# Patient Record
Sex: MALE | Race: WHITE | Employment: FULL TIME | URBAN - METROPOLITAN AREA
[De-identification: names, ages, dates, MRNs, and addresses within clinical notes are randomized per-mention and may not be internally consistent; named-entity substitution may affect disease eponyms.]

---

## 2017-01-04 ENCOUNTER — OP HISTORICAL/CONVERTED ENCOUNTER (OUTPATIENT)
Dept: OTHER | Age: 57
End: 2017-01-04

## 2017-01-27 ENCOUNTER — OP HISTORICAL/CONVERTED ENCOUNTER (OUTPATIENT)
Dept: OTHER | Age: 57
End: 2017-01-27

## 2017-06-16 ENCOUNTER — OP HISTORICAL/CONVERTED ENCOUNTER (OUTPATIENT)
Dept: OTHER | Age: 57
End: 2017-06-16

## 2017-10-23 ENCOUNTER — OP HISTORICAL/CONVERTED ENCOUNTER (OUTPATIENT)
Dept: OTHER | Age: 57
End: 2017-10-23

## 2020-05-04 ENCOUNTER — OFFICE VISIT (OUTPATIENT)
Dept: PRIMARY CARE CLINIC | Age: 60
End: 2020-05-04

## 2020-05-04 DIAGNOSIS — Z20.822 EXPOSURE TO COVID-19 VIRUS: Primary | ICD-10-CM

## 2020-05-04 NOTE — PROGRESS NOTES
Pt seen at [facility] flu clinic. See scanned note . Co worker tested positive over the weekend. Pt asx but would like to be tested.   Verbal consent obtained to treat and bill for services.

## 2020-05-05 LAB — SARS-COV-2, NAA: NOT DETECTED

## 2020-05-18 ENCOUNTER — IP HISTORICAL/CONVERTED ENCOUNTER (OUTPATIENT)
Dept: OTHER | Age: 60
End: 2020-05-18

## 2022-07-28 ENCOUNTER — TRANSCRIBE ORDER (OUTPATIENT)
Dept: SCHEDULING | Age: 62
End: 2022-07-28

## 2022-07-28 DIAGNOSIS — C32.0 MALIGNANT NEOPLASM OF GLOTTIS (HCC): Primary | ICD-10-CM

## 2022-08-04 ENCOUNTER — HOSPITAL ENCOUNTER (OUTPATIENT)
Dept: RADIATION THERAPY | Age: 62
Discharge: HOME | End: 2022-08-04

## 2022-08-04 ENCOUNTER — NURSE NAVIGATOR (OUTPATIENT)
Dept: CASE MANAGEMENT | Age: 62
End: 2022-08-04

## 2022-08-04 NOTE — PROGRESS NOTES
[facility]  Oncology Navigation Assessment    Name: Robert Yang  Age: 57 y.o.  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    Referral Source: Radiation Therapy    Reason for Assessment:   [x]Initial  []Change in status  []Other:     Personal/Family History of Cancer:  [x]No []Yes; type:      Relationship Status:  []Single [x] []Significant Other/Life Partner [] [] []    Support System:    Identified Support system: [x]Spouse/Significant Other []Family  []Friends []Other:   [] Strong  []Fair  []Minimal to None    Living Circumstances:  []Alone  [x]w/ Spouse or Significant Other [] Children/Family  []Caregivers  []Assisted Living Facility/Group Home  []Nursing Facility []Homeless []Environmental/Care Concerns:     Employment Status:  [x]Full-time []Part-time  []Retired []Short-Term Disability []Long-Term Disability []Unemployed []Homemaker     Learning Barriers:    []Mental Status/Mental Health Issue  []Hearing Impairment  []Unable to Read/Write     []Language    []Other:               [x]No Barriers Identified        Barriers to Care and Financial/Legal Concerns:    []Transportation []Citizenship status []Income/Resources  []Food/Clothing Insecurity []Caregiver Makinen  []Noncitizen []Insurance issues []Other:       [x]No Concerns Identified    Advance Care Planning:  []Has AMD   []Does not have AMD    [x]Did Not Address During This Encounter     Coping with Illness:   [x]Coping Well   []Situational Depression []Situational Anxiety []Anticipatory Grief  [] Caregiver Makinen    []Other:     Patient Education Provided:    [] Support Group Information  [] Financial Resources [x] Disease Education [x] Other:  Handout \"Nutrition during cancer treatment\". Narrative:   Pt presented to ENT with hoarseness x months and has been worsening; also c/o moderate pain.  Pt had laryngoscopy with biopsy on 7/18/22. Path shows SCC. Pt also scheduled for lung bx at [facility]. Plan:    Radiation therapy     Referrals:  None at this time. Pt denied any needs but did express concern about being able to work during tx.        Interdisciplinary Team:  Med-Onc:  Surg-Onc:  Rad-Onc:  Dr. Gerber Marrow    Nurse Navigator: Ade Renee RN BSN  [employer]        [phone]

## 2022-08-16 ENCOUNTER — HOSPITAL ENCOUNTER (OUTPATIENT)
Dept: RADIATION THERAPY | Age: 62
Discharge: HOME | End: 2022-08-16
Payer: COMMERCIAL

## 2022-08-16 PROCEDURE — 77334 RADIATION TREATMENT AID(S): CPT

## 2022-08-16 PROCEDURE — 77290 THER RAD SIMULAJ FIELD CPLX: CPT

## 2022-08-19 ENCOUNTER — HOSPITAL ENCOUNTER (OUTPATIENT)
Dept: RADIATION THERAPY | Age: 62
Discharge: HOME | End: 2022-08-19
Payer: COMMERCIAL

## 2022-08-19 PROCEDURE — 77300 RADIATION THERAPY DOSE PLAN: CPT

## 2022-08-19 PROCEDURE — 77295 3-D RADIOTHERAPY PLAN: CPT

## 2022-08-19 PROCEDURE — 77334 RADIATION TREATMENT AID(S): CPT

## 2022-08-22 ENCOUNTER — HOSPITAL ENCOUNTER (OUTPATIENT)
Dept: RADIATION THERAPY | Age: 62
Discharge: HOME | End: 2022-08-22
Payer: COMMERCIAL

## 2022-08-22 PROCEDURE — 77417 THER RADIOLOGY PORT IMAGE(S): CPT

## 2022-08-22 PROCEDURE — 77412 RADIATION TX DELIVERY LVL 3: CPT

## 2022-08-23 ENCOUNTER — HOSPITAL ENCOUNTER (OUTPATIENT)
Dept: RADIATION THERAPY | Age: 62
Discharge: HOME | End: 2022-08-23
Payer: COMMERCIAL

## 2022-08-23 PROCEDURE — 77412 RADIATION TX DELIVERY LVL 3: CPT

## 2022-08-24 ENCOUNTER — HOSPITAL ENCOUNTER (OUTPATIENT)
Dept: RADIATION THERAPY | Age: 62
Discharge: HOME | End: 2022-08-24
Payer: COMMERCIAL

## 2022-08-24 PROCEDURE — 77412 RADIATION TX DELIVERY LVL 3: CPT

## 2022-08-25 ENCOUNTER — HOSPITAL ENCOUNTER (OUTPATIENT)
Dept: RADIATION THERAPY | Age: 62
Discharge: HOME | End: 2022-08-25
Payer: COMMERCIAL

## 2022-08-25 PROCEDURE — 77412 RADIATION TX DELIVERY LVL 3: CPT

## 2022-08-26 ENCOUNTER — HOSPITAL ENCOUNTER (OUTPATIENT)
Dept: RADIATION THERAPY | Age: 62
Discharge: HOME | End: 2022-08-26
Payer: COMMERCIAL

## 2022-08-26 PROCEDURE — 77336 RADIATION PHYSICS CONSULT: CPT

## 2022-08-26 PROCEDURE — 77412 RADIATION TX DELIVERY LVL 3: CPT

## 2022-08-29 ENCOUNTER — HOSPITAL ENCOUNTER (OUTPATIENT)
Dept: RADIATION THERAPY | Age: 62
Discharge: HOME | End: 2022-08-29
Payer: COMMERCIAL

## 2022-08-29 PROCEDURE — 77412 RADIATION TX DELIVERY LVL 3: CPT

## 2022-08-29 PROCEDURE — 77417 THER RADIOLOGY PORT IMAGE(S): CPT

## 2022-08-30 ENCOUNTER — HOSPITAL ENCOUNTER (OUTPATIENT)
Dept: RADIATION THERAPY | Age: 62
Discharge: HOME | End: 2022-08-30
Payer: COMMERCIAL

## 2022-08-30 ENCOUNTER — HOSPITAL ENCOUNTER (OUTPATIENT)
Dept: PHYSICAL THERAPY | Age: 62
Discharge: HOME | End: 2022-08-30
Payer: COMMERCIAL

## 2022-08-30 PROCEDURE — 92610 EVALUATE SWALLOWING FUNCTION: CPT

## 2022-08-30 PROCEDURE — 77412 RADIATION TX DELIVERY LVL 3: CPT

## 2022-08-30 NOTE — THERAPY EVALUATION
[address], [address]  Ph: [phone]    Fax: [phone]    Plan of Care/ Statement of Necessity for Speech Therapy Services    Patient name: Deonna Katz Start of Care: 2022   Referral source: Bailee Rodriguez MD : 1960    Medical Diagnosis: Dysphagia, unspecified [R13.10]  Malignant neoplasm of glottis [C32.0]   Onset Date:22    Treatment Diagnosis: dysphagia   Prior Hospitalization: see medical history Provider#: [id number]   Medications: Verified on Patient summary List    Comorbidities: multiple ortho surgeries, hx of tobacco use   Prior Level of Function: independent    The Plan of Care and following information is based on the information from the initial evaluation. Assessment/ key information: Patient presents w/ minimal dysphagia s/t dx squamous cell carcinoma of the glottis and odynophagia. Patient describes his symptoms as globus sensation and feeling like he is going to get choked. He does report increase need to clear his throat after swallowing. Radiation effects of dry mouth, decreased taste and increased pain can continue to impact patient's swallow function and over-all nutritional intake placing him at risk of aspiration, dehydration and nutritional decline. Recent tooth removal ( molars ) has negatively impacted patient's ability to masticate, which he is trying to adapt to and would benefit from modified diet textures. Oral motor largely WFL at this time w/ some irritation noted around soft palate. Swallow initiation timely, however effortful. Intermittent clinical indicators of penetration/aspiration noted after the swallows of thins and solids c/b through clearing. Patient would benefit from ST services to establish prophylactic swallowing exercises, maintain swallow function and continued assessment of oral and pharyngeal function for diet modifications as needed.      Problem List:   Dysphagia    Treatment Plan may include any combination of the following: Dysphagia Treatment, Treatment of Swallowing, and Patient Education      Patient / Family readiness to learn indicated by: asking questions, trying to perform skills, and interest    Persons(s) to be included in education:   patient (P)    Barriers to Learning/Limitations: yes;  none    Patient Goal (s): To continue to carry on    Patient Self Reported Health Status: good    Rehabilitation Potential: good    AMPA score: 32.49%    MD. Glory Morel Dysphagia Inventory: 57.89% -Moderate functioning    Short Term Goals: To be accomplished in 4-6 treatments  -Establish prophylactic swallowing exercises to preserve and maintain swallow function   -Modify diet as tolerated to reduce risk of aspiration and nutritional decline   -MBS as indicated   -On-going assessment of oral and pharyngeal changes s/p radiation tx   -Tolerate LRD w/o overt s/sx of aspiration/penetration and adequate nutritional intake     Long Term Goals:  To be accomplished in 8-12 treatments   -Maintain p.o. intake of least restrictive diet w/o clinical indicators of penetration/aspiration and adequate nutritional intake  -Preserve and maintain swallow function w/ use of oral and pharyngeal swallowing exercises and compensatory swallow strategies.   -Increase percentage of MD. Glory Morel Dysphagia Inventory by 5%    Frequency / Duration: Patient to be seen 1 times per week for 12 weeks:      Certification Period: 22-22      SWALLOW EVALUATION    Patient Name: Yash Hammer  Date:2022   : 1960  [x]  Patient  Verified  Payor: Mellissa Regan / Plan: [place] [employer] / Product Type: HMO /   In time: 0145pm Out time: 0240pm      SUBJECTIVE  Pain Level (0-10 scale): 0  Any medication changes, allergies to medications, adverse drug reactions, diagnosis change, or new procedure performed?: [] No    [x] Yes (see summary sheet for update)  Patient seen for swallow evaluation. He is referred by his radiation oncologist s/p dx of squamous cell carcinoma of the glottis. CASE HISTORY:   57 yom w/ dx of squamous cell carcinoma of the glottis 7/12/22. Patient was referred to radiotherapy for his glottic cancer and is planned to have 30 XRT. Patient started XRT 8/22 and has completed 7 total to date. Presently, patient reports inconsistent swallowing difficulty and reports it as, \" feeling like I am going to get choked. \" He reported onset of swallowing difficulty s/p his biopsy. He has seen a dentist prior to radiation tx, in which he had all his molars pulled. He feels this has impacted his swallowing and ability to chew. Patient has started to experience some pain when swallowing. Vocal quality is hoarse given the presence of tumor. He is currently consuming a regular diet and thin liquids. His goals are to maintain swallow function and avoid a feeding tube. Patient is  and is currently working as a superintendent.            OBJECTIVE FINDINGS:       Dentition:  []intact [x]sparse []edentulous []dentures  []partials  Respiratory Status: [x]WFL []SOB  []O2 L/min:  []NC []Mask         []Trach Tube:                     []Excess secretions      [] cuffed []cuffless  Size and type  Lips:  [x]Symmetrical  []asymmetrical  Retraction [x]WFL  []?min []?mod []?max  Protrusion [x]WFL  []?min []?mod []?max  Strength [x]WFL  []?min []?mod []?max  Puff  [x]WFL  []?min []?mod []?max  Tongue:  [x]Symmetrical             []asymmetrical  Protrusion [x]WFL  []?min []?mod []?max  Elevation [x]WFL  []?min []?mod []?max  Depression [x]WFL  []?min []?mod []?max  Lateralization [x]WFL  []?min []?mod []?max  Strength [x]WFL  []?min []?mod []?max  Velum:  [x]Symmetrical  []asymmetrical  Gag Reflex:  []Present []absent [x]DNT  Sensation:  [x]Intact []Diminished  Specify:   Voice:  []Normal [x]Hoarse []harsh  []Breathy []Hypernasal []hyponasal  []Gurgly Other: Swallow:  [x]Volitional []absent  [x]Reflexive []absent  Cough   Strength : [x]WNL  []Diminished  []Volitional []absent  []Reflexive []absent      OP SWALLOW EVALUATION  Observation of Swallow: Thin Nectar Honey Puree  applesauce Solids  Mechanical soft fruit and grain bar/regular peanut butter cracker nabs Other  Mixed consistency canned fruit cocktail   Oral Phase WFL        Increased mastication     X    Other         Oral Pharyngeal Phase WFL        Delayed swallow initiation     X    Pharyngeal Phase         Throat clear post swallow X    X    Other     C/o of effortful swallow      [] No symptoms of dysphagia evidenced  [x] Symptoms of dysphagia observed  [] Patient at risk for aspiration  [] Other:     RECOMMENDATIONS/PLAN:   Soft diet, thin liquids. Compensatory swallow strategies of : slow rate, small bites and sips, effortful swallows. REFLUX precautions  ST 1x a week for dysphagia tx  Prophylactic swallowing exercises   MBS as indicated  Biotene for dry mouth  Continue to use Triple Rinse solution for oral pain  Oral hygiene protocol.         Diet:  [] NPO    [] Pureed [] Ground [x] MechSoft [] Regular  Liquids: [] Water  [x] Regular [] Thickened   [] [place]  [] Honeythick  [] Pudding    [] Free Water  Presentation: [] Cup    [] Spoon [] Straw [] Alternate liquids and solids  Monitor: [] Sitting up at 90 deg [] Reclined to:  [] Head turned to:    [] Chin tuck  [] Head tilt to:      [] Seated upright post meals (min):  Document: [x] Coughing [] Temperatures [] Lung Sounds    Videoflouroscopy:  [x] Yes [] No  Dysphagia Treatment: [x] Yes [] No Sessions per week: 1x a week  Other:    Remediation Techniques:  C = Compensatory techniques to use during meal      F = Facilitation/treatment techniques by SLP    [] Supraglottic Swallow (c,f)    [] Oral motor exercises (f)  [] Super-supraglottic swallow (c,f)   [] Labial closure  [] Compensations for pocketing (c)   [] Lingual elevation  [] Sweep mouth with tongue    [] Lingual lateralization  [] Sweep mouth with finger    [] Lingual anterior-posterior  [] External pressure to check              [] Lingual base of tongue  [] Rinse mouth/expel after meal   [] Vocal Fold Exercises (f)  [] Alternate liquid swallows every _ bites (c)  [] Falsetto/laryngeal elevation exercises (f)  [] Discourage liquid wash between bites (c)  [] Thermal application (c,f)  [x] Multiple swallows     [] Sour bolus (f)   [x] small bites and sips    [] Cold bolus (f)  [x] slow rate       [x] Pharyngeal exercise (f)  [x] GERD precautions      [x] Breath hold    [] Patient needs cues     [x] Effortful Swallow (c,f)    [] Patient does not need cues   [x] Tongue base retraction   [] Mendelsohn Maneuver (c,f)    [x] Tongue hold    [] Encourage/stimulate lip closure (c)  [] Laryngeal closure    [] Empty mouth before next bite (c)   [] NMES   [] Cue patient to slow down (c)   [x] Manual therapy/myofascial release     [] Encourage coughing (c)      []Chin tuck (c)  []Head turn  (c)  []Head turn , chin tuck (c)    Patient/Caregiver instruction/education: Patient educated to findings, diet recommendations, compensatory swallow strategies and POC  HEP/Handouts given: Dysphagia w/ Head and Neck CA packet and compensatory swallow strategies handout    Pain Level (0-10 scale) post treatment: 0      Natacha Lowery M.S. [id number] [place] [address] 8/30/2022 11:33 AM       ________________________________________________________________________    I certify that the above Therapy Services are being furnished while the patient is under my care. I agree with the treatment plan and certify that this therapy is necessary.     [de-identified] Signature:____________________  Date:___________Time:_________           Benson Varela MD        Please sign and return to [address] [address] [facility] [address]  Ph: [phone]    Fax: [phone]     Thank you

## 2022-08-31 ENCOUNTER — HOSPITAL ENCOUNTER (OUTPATIENT)
Dept: RADIATION THERAPY | Age: 62
Discharge: HOME | End: 2022-08-31
Payer: COMMERCIAL

## 2022-08-31 PROCEDURE — 77412 RADIATION TX DELIVERY LVL 3: CPT

## 2022-09-01 ENCOUNTER — HOSPITAL ENCOUNTER (OUTPATIENT)
Dept: RADIATION THERAPY | Age: 62
Discharge: HOME | End: 2022-09-01
Payer: COMMERCIAL

## 2022-09-01 PROCEDURE — 77412 RADIATION TX DELIVERY LVL 3: CPT

## 2022-09-02 ENCOUNTER — HOSPITAL ENCOUNTER (OUTPATIENT)
Dept: RADIATION THERAPY | Age: 62
Discharge: HOME | End: 2022-09-02
Payer: COMMERCIAL

## 2022-09-02 PROCEDURE — 77336 RADIATION PHYSICS CONSULT: CPT

## 2022-09-02 PROCEDURE — 77412 RADIATION TX DELIVERY LVL 3: CPT

## 2022-09-06 ENCOUNTER — HOSPITAL ENCOUNTER (OUTPATIENT)
Dept: RADIATION THERAPY | Age: 62
Discharge: HOME | End: 2022-09-06
Payer: COMMERCIAL

## 2022-09-06 PROCEDURE — 77412 RADIATION TX DELIVERY LVL 3: CPT

## 2022-09-06 PROCEDURE — 77417 THER RADIOLOGY PORT IMAGE(S): CPT

## 2022-09-07 ENCOUNTER — HOSPITAL ENCOUNTER (OUTPATIENT)
Dept: RADIATION THERAPY | Age: 62
Discharge: HOME | End: 2022-09-07
Payer: COMMERCIAL

## 2022-09-07 PROCEDURE — 77412 RADIATION TX DELIVERY LVL 3: CPT

## 2022-09-08 ENCOUNTER — HOSPITAL ENCOUNTER (OUTPATIENT)
Dept: PHYSICAL THERAPY | Age: 62
Discharge: HOME | End: 2022-09-08
Payer: COMMERCIAL

## 2022-09-08 ENCOUNTER — HOSPITAL ENCOUNTER (OUTPATIENT)
Dept: RADIATION THERAPY | Age: 62
Discharge: HOME | End: 2022-09-08
Payer: COMMERCIAL

## 2022-09-08 PROCEDURE — 92526 ORAL FUNCTION THERAPY: CPT

## 2022-09-08 PROCEDURE — 77412 RADIATION TX DELIVERY LVL 3: CPT

## 2022-09-08 NOTE — PROGRESS NOTES
ST DAILY TREATMENT NOTE    Patient Name: Amita Duarte  Date:2022  : 1960  [x]  Patient  Verified  Payor: Payor: Mila Pena / Plan: Portia Trinidad / Product Type: HMO /   In time:  Out time: 0315PM  Total Treatment Time (min): 45  Total Timed Codes (min): 45  1:1 Treatment Time ( [address] only): 45      Treatment Diagnosis: Dysphagia, unspecified [R13.10]  Malignant neoplasm of glottis [C32.0]    SUBJECTIVE  Pain Level (0-10 scale): 0  Any medication changes, allergies to medications, adverse drug reactions, diagnosis change, or new procedure performed?: [x] No    [] Yes (see summary sheet for update)    Subjective functional status/changes:       Patient seen for ST. Patient reports changes in p.o. intake due to radiation effects. Please see below      OBJECTIVE  Treatment provided includes:   IIncrease/Improve:  []  Voice Quality []  Cognitive Linguistic Skills [x]   Laryngeal/Pharyngeal Exercises   []  Vocal Loudness []  Reading Comprehension [x]  Swallowing Skills    []  Vocal Cord Function []  Auditory Comprehension []  Oral Motor Skills   []  Resonance []  Writing Skills [x]  Compensatory strategies    []  Speech Intelligibility []  Expressive Language []  Attention   []  Breath Support/Coord. []  Receptive language []  Memory   []  Articulation []  Safety Awareness []    []  Fluency []  Word Retrieval []        Treatment Provided:    Chemo/radiation check list:  XRT completed:   Chemo completed: 0/0  Weight loss to date: 3-4 lbs. Current weight 278lbs  Current diet: Patient reports a combination of puree's and liquids. Soups, milkshakes.      Weight loss:  YES   Dry mouth:  YES- using biotene, which has mad a big difference per patient   Loss of taste:  YES-no taste   Pain at rest:  NO   Pain when swallowing:  YES- 6 +   Changes in swallow function (oral and/or pharyngeal ):  NO-  patient reports an effortful swallow and fear of getting choked but feels this is unchanged from baseline   Tolerating diet:  NO- has regressed to puree's and liquids. External tissue changes:  YES- Red   Internal tissue changes ( oral and orophayngeal cavity ):  YES- Red. Irritation of oropharynx. Administered thin liquids to trial. Patient w/ slight hesitation in swallow initiation s/t anticipated pain. Effortful swallow response w/ facial grimacing. \" Feels like I am swallowing glass. \" Hyolaryngeal excursion and protraction appears adequate to digital palpation. No clinical indicators of penetration or aspiration noted. Administered solid. Patient w/ slow mastication and oral transit. Over-all c/o of dryness. Pharyngeal response adequate, however patient reports globus sensation after the swallow. Liquid wash aids in some reduction in globus sensation, however sensation still present. Delayed cough response present. Patient educated to and completed prophylactic pharyngeal and laryngeal exercises to preserve and maintain swallow function. Exercises completed:   Pennie - x10  Lingual press x 10  Supraglottic swallow x 10   Effortful swallow x10   Shaker x 10  Lingual extension x10     At this time it is recommended patient consume puree's and liquids as tolerated. Use of magic mouthwash and biotene as needed. Advised patient that he may benefit from Glucerna shakes (patient is a diabetic) for additional nutrition. Patient has reported a drop in his intake due to odynophagia. Educated patient to clinical indicators of aspiration.          Patient/Caregiver  Education: [x] Review HEP     Swallowing exercises as tolerated    Pain Level (0-10 scale) post treatment: 0    ASSESSMENT   []   Improving appropriately and progressing toward goals  [x]   Improving slowly and progressing toward goals  []   Approximating goals/maximum potential  []   Continues to benefit from skilled therapy to address remaining functional deficits  []   Not progressing toward goals and plan of care will be adjusted    Patient will continue to benefit from skilled therapy to address remaining functional deficits: dysphagia     Progress towards goals / Updated goals:     PLAN  [x]  Continue plan of care  []  Modify Goals/Treatment Plan      []  Discharge due to:  [] Other:    Short Term Goals: To be accomplished in 4-6 treatments  -Establish prophylactic swallowing exercises to preserve and maintain swallow function   -Modify diet as tolerated to reduce risk of aspiration and nutritional decline   -MBS as indicated   -On-going assessment of oral and pharyngeal changes s/p radiation tx   -Tolerate LRD w/o overt s/sx of aspiration/penetration and adequate nutritional intake      Long Term Goals:  To be accomplished in 8-12 treatments   -Maintain p.o. intake of least restrictive diet w/o clinical indicators of penetration/aspiration and adequate nutritional intake  -Preserve and maintain swallow function w/ use of oral and pharyngeal swallowing exercises and compensatory swallow strategies.   -Increase percentage of [facility] by 5%      Maryam Rodriguez, SLP, M.S. [id number] [facility]   9/8/2022  1:57 PM

## 2022-09-09 ENCOUNTER — HOSPITAL ENCOUNTER (OUTPATIENT)
Dept: RADIATION THERAPY | Age: 62
Discharge: HOME | End: 2022-09-09
Payer: COMMERCIAL

## 2022-09-09 PROCEDURE — 77412 RADIATION TX DELIVERY LVL 3: CPT

## 2022-09-12 ENCOUNTER — HOSPITAL ENCOUNTER (OUTPATIENT)
Dept: RADIATION THERAPY | Age: 62
Discharge: HOME | End: 2022-09-12
Payer: COMMERCIAL

## 2022-09-12 PROCEDURE — 77417 THER RADIOLOGY PORT IMAGE(S): CPT

## 2022-09-12 PROCEDURE — 77412 RADIATION TX DELIVERY LVL 3: CPT

## 2022-09-12 PROCEDURE — 77336 RADIATION PHYSICS CONSULT: CPT

## 2022-09-13 ENCOUNTER — HOSPITAL ENCOUNTER (OUTPATIENT)
Dept: RADIATION THERAPY | Age: 62
Discharge: HOME | End: 2022-09-13
Payer: COMMERCIAL

## 2022-09-13 PROCEDURE — 77412 RADIATION TX DELIVERY LVL 3: CPT

## 2022-09-14 ENCOUNTER — HOSPITAL ENCOUNTER (OUTPATIENT)
Dept: RADIATION THERAPY | Age: 62
Discharge: HOME | End: 2022-09-14
Payer: COMMERCIAL

## 2022-09-14 PROCEDURE — 77412 RADIATION TX DELIVERY LVL 3: CPT

## 2022-09-15 ENCOUNTER — HOSPITAL ENCOUNTER (OUTPATIENT)
Dept: PHYSICAL THERAPY | Age: 62
Discharge: HOME | End: 2022-09-15
Payer: COMMERCIAL

## 2022-09-15 ENCOUNTER — HOSPITAL ENCOUNTER (OUTPATIENT)
Dept: RADIATION THERAPY | Age: 62
Discharge: HOME | End: 2022-09-15
Payer: COMMERCIAL

## 2022-09-15 PROCEDURE — 77412 RADIATION TX DELIVERY LVL 3: CPT

## 2022-09-15 PROCEDURE — 92526 ORAL FUNCTION THERAPY: CPT

## 2022-09-15 NOTE — PROGRESS NOTES
ST DAILY TREATMENT NOTE    Patient Name: Haley Salguero  Date:9/15/2022  : 1960  [x]  Patient  Verified  Payor: Payor: Dayanara Sofia / Plan: Yaa December / Product Type: HMO /   In time: 1130AM Out time:1215PM  Total Treatment Time (min): 45  Total Timed Codes (min): 45  1:1 Treatment Time ( [address] only): 45        Treatment Diagnosis: Dysphagia, unspecified [R13.10]  Malignant neoplasm of glottis [C32.0]    SUBJECTIVE  Pain Level (0-10 scale): 7/10 throat  Any medication changes, allergies to medications, adverse drug reactions, diagnosis change, or new procedure performed?: [x] No    [] Yes (see summary sheet for update)    Subjective functional status/changes:   [] No changes reported  Patient seen for swallow tx. Patient reports increased throat pain at test impacting swallowing function and oral intake. OBJECTIVE  Treatment provided includes:   IIncrease/Improve:  []  Voice Quality []  Cognitive Linguistic Skills [x]  Laryngeal/Pharyngeal Exercises   []  Vocal Loudness []  Reading Comprehension [x]  Swallowing Skills    []  Vocal Cord Function []  Auditory Comprehension []  Oral Motor Skills   []  Resonance []  Writing Skills [x]  Compensatory strategies    []  Speech Intelligibility []  Expressive Language []  Attention   []  Breath Support/Coord. []  Receptive language []  Memory   []  Articulation []  Safety Awareness []    []  Fluency []  Word Retrieval []        Treatment Provided:  Chemo/radiation check list:  XRT completed:   Chemo completed: 0  Current Weight 256. Patient has lost 22lb in a 2 weeks since last tx session 22.   Current diet: puree's, liquids, some mechanical soft textures    Weight loss:  YES -22lbs in two weeks   Dry mouth:  YES   Loss of taste:  YES   Pain at rest:  YES 7/10   Pain when swallowing:  YES 10+   Changes in swallow function (oral and/or pharyngeal ):  YES- increased coughing when swallowing   Tolerating diet:  YES- but oral intake is limited to modified textures and amount   External tissue changes:  YES- Red, sunburn   Internal tissue changes ( oral and orophayngeal cavity ):  YES- oral all oral mucosa looks pink and healthy. There are few ulcers noted near corners of mouth. Oral and pharyngeal assessment completed. Oral ROM remains functional w/ tissue largely pink and healthy w/o much visible irritation. Volitional swallows are effortful w/ pain. Patient reports 10+ pain when swallowing, increased mucous and increased swallowing difficulty, which is negatively impacting his p.o. intake. Administered thin liquids to trial. Patient w/ increased clinical indicators of penetration and aspiration after the swallow c/b cough response and throat clear. This is inconsistent. Pharyngeal swallow w/ atypical swallow sounds ( sounds tight ) and presumptive reduced pharyngeal clearance s/t multiple swallows elicited. Use of chin tuck does reduce these clinical indicators of penetration and aspiration and patient reports improved swallow. S/sx of aspiration and penetration also reduced w/ use of mildly thick liquids. At this time there are concerns for change in swallow function increasing patient's risk for aspiration. Reviewed these clinical indicators w/ patient and compensatory swallow strategies to mitigate these symptoms. Changes in swallow function could be impacted by pain, increased mucous and pharyngeal edema from radiation. Educated to use of thickener and hand out provided. Educated to use of chin tuck w/ thin liquids. Due to patient's decreased oral intake. Helped patient set up meal planning goals and advised to try to consume more frequent meals through out the day vs 3 large meals. MBS to be completed when patient completes his radiation tx. Patient/Caregiver  Education: [x] Review HEP      Continue w/ puree and ground textures as tolerated.    Thin liquids with use of chin tuck (helps protect the airway)  Thicken liquids if signs/symptoms of aspiration increase (coughing with swallowing ) - see separate handout   Consume frequent snacks through out the day vs 3 large meals   Try meal planning   Better to snack then skip a meal  Room temperature liquids     HEP/Handouts given: swallow strategies and recommendations. Pain Level (0-10 scale) post treatment: 0    ASSESSMENT   [x]   Improving appropriately and progressing toward goals  [x]   Improving slowly and progressing toward goals  []   Approximating goals/maximum potential  []   Continues to benefit from skilled therapy to address remaining functional deficits  []   Not progressing toward goals and plan of care will be adjusted    Patient will continue to benefit from skilled therapy to address remaining functional deficits: swallowing    Progress towards goals / Updated goals:     PLAN  [x]  Continue plan of care  []  Modify Goals/Treatment Plan      []  Discharge due to:  [] Other:    Short Term Goals: To be accomplished in 4-6 treatments  -Establish prophylactic swallowing exercises to preserve and maintain swallow function   -Modify diet as tolerated to reduce risk of aspiration and nutritional decline   -MBS as indicated   -On-going assessment of oral and pharyngeal changes s/p radiation tx   -Tolerate LRD w/o overt s/sx of aspiration/penetration and adequate nutritional intake      Long Term Goals:  To be accomplished in 8-12 treatments   -Maintain p.o. intake of least restrictive diet w/o clinical indicators of penetration/aspiration and adequate nutritional intake  -Preserve and maintain swallow function w/ use of oral and pharyngeal swallowing exercises and compensatory swallow strategies.   -Increase percentage of MD. [place] Marie Dysphagia Inventory by 5%    Ally Peacock, LEXI, M.S. [id number] [facility]   9/15/2022  1:57 PM

## 2022-09-16 ENCOUNTER — HOSPITAL ENCOUNTER (OUTPATIENT)
Dept: RADIATION THERAPY | Age: 62
Discharge: HOME | End: 2022-09-16
Payer: COMMERCIAL

## 2022-09-16 PROCEDURE — 77412 RADIATION TX DELIVERY LVL 3: CPT

## 2022-09-19 ENCOUNTER — HOSPITAL ENCOUNTER (OUTPATIENT)
Dept: RADIATION THERAPY | Age: 62
Discharge: HOME | End: 2022-09-19
Payer: COMMERCIAL

## 2022-09-19 PROCEDURE — 77417 THER RADIOLOGY PORT IMAGE(S): CPT

## 2022-09-19 PROCEDURE — 77336 RADIATION PHYSICS CONSULT: CPT

## 2022-09-19 PROCEDURE — 77412 RADIATION TX DELIVERY LVL 3: CPT

## 2022-09-20 ENCOUNTER — HOSPITAL ENCOUNTER (OUTPATIENT)
Dept: RADIATION THERAPY | Age: 62
Discharge: HOME | End: 2022-09-20
Payer: COMMERCIAL

## 2022-09-20 PROCEDURE — 77412 RADIATION TX DELIVERY LVL 3: CPT

## 2022-09-21 ENCOUNTER — HOSPITAL ENCOUNTER (OUTPATIENT)
Dept: RADIATION THERAPY | Age: 62
Discharge: HOME | End: 2022-09-21
Payer: COMMERCIAL

## 2022-09-21 PROCEDURE — 77412 RADIATION TX DELIVERY LVL 3: CPT

## 2022-09-22 ENCOUNTER — HOSPITAL ENCOUNTER (OUTPATIENT)
Dept: RADIATION THERAPY | Age: 62
Discharge: HOME | End: 2022-09-22
Payer: COMMERCIAL

## 2022-09-22 PROCEDURE — 77412 RADIATION TX DELIVERY LVL 3: CPT

## 2022-09-23 ENCOUNTER — HOSPITAL ENCOUNTER (OUTPATIENT)
Dept: RADIATION THERAPY | Age: 62
Discharge: HOME | End: 2022-09-23
Payer: COMMERCIAL

## 2022-09-23 PROCEDURE — 77412 RADIATION TX DELIVERY LVL 3: CPT

## 2022-09-26 ENCOUNTER — HOSPITAL ENCOUNTER (INPATIENT)
Age: 62
LOS: 2 days | Discharge: HOME HEALTH | DRG: 640 | End: 2022-09-28
Attending: STUDENT IN AN ORGANIZED HEALTH CARE EDUCATION/TRAINING PROGRAM | Admitting: INTERNAL MEDICINE
Payer: COMMERCIAL

## 2022-09-26 ENCOUNTER — HOSPITAL ENCOUNTER (OUTPATIENT)
Dept: RADIATION THERAPY | Age: 62
Discharge: HOME | End: 2022-09-26
Payer: COMMERCIAL

## 2022-09-26 DIAGNOSIS — N17.9 AKI (ACUTE KIDNEY INJURY) (HCC): Primary | ICD-10-CM

## 2022-09-26 DIAGNOSIS — R62.7 FTT (FAILURE TO THRIVE) IN ADULT: ICD-10-CM

## 2022-09-26 PROBLEM — E86.0 DEHYDRATION: Status: ACTIVE | Noted: 2022-09-26

## 2022-09-26 LAB
ANION GAP SERPL CALC-SCNC: 7 MMOL/L (ref 5–15)
BASOPHILS # BLD: 0 K/UL (ref 0–0.1)
BASOPHILS NFR BLD: 1 % (ref 0–1)
BUN SERPL-MCNC: 80 MG/DL (ref 6–20)
BUN/CREAT SERPL: 35 (ref 12–20)
CA-I BLD-MCNC: 9.7 MG/DL (ref 8.5–10.1)
CHLORIDE SERPL-SCNC: 99 MMOL/L (ref 97–108)
CO2 SERPL-SCNC: 28 MMOL/L (ref 21–32)
CREAT SERPL-MCNC: 2.26 MG/DL (ref 0.7–1.3)
DIFFERENTIAL METHOD BLD: ABNORMAL
EOSINOPHIL # BLD: 0.1 K/UL (ref 0–0.4)
EOSINOPHIL NFR BLD: 2 % (ref 0–7)
ERYTHROCYTE [DISTWIDTH] IN BLOOD BY AUTOMATED COUNT: 13.3 % (ref 11.5–14.5)
GLUCOSE SERPL-MCNC: 132 MG/DL (ref 65–100)
HCT VFR BLD AUTO: 39.2 % (ref 36.6–50.3)
HGB BLD-MCNC: 13.4 G/DL (ref 12.1–17)
IMM GRANULOCYTES # BLD AUTO: 0 K/UL (ref 0–0.04)
IMM GRANULOCYTES NFR BLD AUTO: 0 % (ref 0–0.5)
LACTATE SERPL-SCNC: 1.4 MMOL/L (ref 0.4–2)
LYMPHOCYTES # BLD: 1.4 K/UL (ref 0.8–3.5)
LYMPHOCYTES NFR BLD: 22 % (ref 12–49)
MCH RBC QN AUTO: 30 PG (ref 26–34)
MCHC RBC AUTO-ENTMCNC: 34.2 G/DL (ref 30–36.5)
MCV RBC AUTO: 87.9 FL (ref 80–99)
MONOCYTES # BLD: 0.9 K/UL (ref 0–1)
MONOCYTES NFR BLD: 14 % (ref 5–13)
NEUTS SEG # BLD: 4.1 K/UL (ref 1.8–8)
NEUTS SEG NFR BLD: 61 % (ref 32–75)
NRBC # BLD: 0 K/UL (ref 0–0.01)
NRBC BLD-RTO: 0 PER 100 WBC
PLATELET # BLD AUTO: 320 K/UL (ref 150–400)
PMV BLD AUTO: 9.3 FL (ref 8.9–12.9)
POTASSIUM SERPL-SCNC: 4 MMOL/L (ref 3.5–5.1)
RBC # BLD AUTO: 4.46 M/UL (ref 4.1–5.7)
SODIUM SERPL-SCNC: 134 MMOL/L (ref 136–145)
WBC # BLD AUTO: 6.6 K/UL (ref 4.1–11.1)

## 2022-09-26 PROCEDURE — 36415 COLL VENOUS BLD VENIPUNCTURE: CPT

## 2022-09-26 PROCEDURE — 99285 EMERGENCY DEPT VISIT HI MDM: CPT

## 2022-09-26 PROCEDURE — 85025 COMPLETE CBC W/AUTO DIFF WBC: CPT

## 2022-09-26 PROCEDURE — 74011250636 HC RX REV CODE- 250/636: Performed by: INTERNAL MEDICINE

## 2022-09-26 PROCEDURE — 74011000250 HC RX REV CODE- 250: Performed by: STUDENT IN AN ORGANIZED HEALTH CARE EDUCATION/TRAINING PROGRAM

## 2022-09-26 PROCEDURE — 74011250636 HC RX REV CODE- 250/636: Performed by: STUDENT IN AN ORGANIZED HEALTH CARE EDUCATION/TRAINING PROGRAM

## 2022-09-26 PROCEDURE — 83605 ASSAY OF LACTIC ACID: CPT

## 2022-09-26 PROCEDURE — 74011250637 HC RX REV CODE- 250/637: Performed by: INTERNAL MEDICINE

## 2022-09-26 PROCEDURE — 77336 RADIATION PHYSICS CONSULT: CPT

## 2022-09-26 PROCEDURE — 80048 BASIC METABOLIC PNL TOTAL CA: CPT

## 2022-09-26 PROCEDURE — 77412 RADIATION TX DELIVERY LVL 3: CPT

## 2022-09-26 PROCEDURE — 65270000029 HC RM PRIVATE

## 2022-09-26 PROCEDURE — 96360 HYDRATION IV INFUSION INIT: CPT

## 2022-09-26 PROCEDURE — 74011000250 HC RX REV CODE- 250: Performed by: HOSPITALIST

## 2022-09-26 PROCEDURE — 77417 THER RADIOLOGY PORT IMAGE(S): CPT

## 2022-09-26 PROCEDURE — 74011000250 HC RX REV CODE- 250: Performed by: INTERNAL MEDICINE

## 2022-09-26 RX ORDER — SERTRALINE HYDROCHLORIDE 50 MG/1
50 TABLET, FILM COATED ORAL
Status: ON HOLD | COMMUNITY
End: 2022-09-28 | Stop reason: SDUPTHER

## 2022-09-26 RX ORDER — SILVER SULFADIAZINE 10 G/1000G
CREAM TOPICAL 3 TIMES DAILY
COMMUNITY

## 2022-09-26 RX ORDER — POLYETHYLENE GLYCOL 3350 17 G/17G
17 POWDER, FOR SOLUTION ORAL DAILY PRN
Status: DISCONTINUED | OUTPATIENT
Start: 2022-09-26 | End: 2022-09-28 | Stop reason: HOSPADM

## 2022-09-26 RX ORDER — ONDANSETRON 4 MG/1
4 TABLET, ORALLY DISINTEGRATING ORAL
Status: DISCONTINUED | OUTPATIENT
Start: 2022-09-26 | End: 2022-09-28 | Stop reason: HOSPADM

## 2022-09-26 RX ORDER — ENOXAPARIN SODIUM 100 MG/ML
30 INJECTION SUBCUTANEOUS 2 TIMES DAILY
Status: DISCONTINUED | OUTPATIENT
Start: 2022-09-26 | End: 2022-09-28 | Stop reason: HOSPADM

## 2022-09-26 RX ORDER — TAMSULOSIN HYDROCHLORIDE 0.4 MG/1
0.4 CAPSULE ORAL DAILY
COMMUNITY
End: 2022-09-28

## 2022-09-26 RX ORDER — LISINOPRIL 20 MG/1
20 TABLET ORAL DAILY
COMMUNITY
End: 2022-09-28

## 2022-09-26 RX ORDER — ACETAMINOPHEN 325 MG/1
650 TABLET ORAL
Status: DISCONTINUED | OUTPATIENT
Start: 2022-09-26 | End: 2022-09-28 | Stop reason: HOSPADM

## 2022-09-26 RX ORDER — SILVER SULFADIAZINE 10 G/1000G
CREAM TOPICAL 3 TIMES DAILY
Status: DISCONTINUED | OUTPATIENT
Start: 2022-09-26 | End: 2022-09-28 | Stop reason: HOSPADM

## 2022-09-26 RX ORDER — LIDOCAINE HYDROCHLORIDE 20 MG/ML
JELLY TOPICAL AS NEEDED
Status: DISCONTINUED | OUTPATIENT
Start: 2022-09-26 | End: 2022-09-28 | Stop reason: HOSPADM

## 2022-09-26 RX ORDER — SODIUM CHLORIDE 0.9 % (FLUSH) 0.9 %
5-40 SYRINGE (ML) INJECTION EVERY 8 HOURS
Status: DISCONTINUED | OUTPATIENT
Start: 2022-09-26 | End: 2022-09-28 | Stop reason: HOSPADM

## 2022-09-26 RX ORDER — SODIUM CHLORIDE 9 MG/ML
125 INJECTION, SOLUTION INTRAVENOUS CONTINUOUS
Status: DISCONTINUED | OUTPATIENT
Start: 2022-09-26 | End: 2022-09-28 | Stop reason: HOSPADM

## 2022-09-26 RX ORDER — METOPROLOL SUCCINATE 50 MG/1
50 TABLET, EXTENDED RELEASE ORAL DAILY
COMMUNITY
End: 2022-09-28

## 2022-09-26 RX ORDER — LIDOCAINE HYDROCHLORIDE 20 MG/ML
15 SOLUTION OROPHARYNGEAL
Status: COMPLETED | OUTPATIENT
Start: 2022-09-26 | End: 2022-09-26

## 2022-09-26 RX ORDER — GABAPENTIN 300 MG/1
300 CAPSULE ORAL 3 TIMES DAILY
COMMUNITY

## 2022-09-26 RX ORDER — ONDANSETRON 2 MG/ML
4 INJECTION INTRAMUSCULAR; INTRAVENOUS
Status: DISCONTINUED | OUTPATIENT
Start: 2022-09-26 | End: 2022-09-28 | Stop reason: HOSPADM

## 2022-09-26 RX ORDER — SODIUM CHLORIDE 0.9 % (FLUSH) 0.9 %
5-40 SYRINGE (ML) INJECTION AS NEEDED
Status: DISCONTINUED | OUTPATIENT
Start: 2022-09-26 | End: 2022-09-28 | Stop reason: HOSPADM

## 2022-09-26 RX ORDER — ACETAMINOPHEN 650 MG/1
650 SUPPOSITORY RECTAL
Status: DISCONTINUED | OUTPATIENT
Start: 2022-09-26 | End: 2022-09-28 | Stop reason: HOSPADM

## 2022-09-26 RX ADMIN — SODIUM CHLORIDE 125 ML/HR: 9 INJECTION, SOLUTION INTRAVENOUS at 19:50

## 2022-09-26 RX ADMIN — LIDOCAINE HYDROCHLORIDE 15 ML: 20 SOLUTION ORAL; TOPICAL at 16:34

## 2022-09-26 RX ADMIN — SODIUM CHLORIDE, PRESERVATIVE FREE 20 MG: 5 INJECTION INTRAVENOUS at 22:22

## 2022-09-26 RX ADMIN — SILVER SULFADIAZINE: 10 CREAM TOPICAL at 20:46

## 2022-09-26 RX ADMIN — SODIUM CHLORIDE, PRESERVATIVE FREE 10 ML: 5 INJECTION INTRAVENOUS at 21:23

## 2022-09-26 RX ADMIN — LIDOCAINE HYDROCHLORIDE 30 ML: 20 SOLUTION ORAL; TOPICAL at 18:51

## 2022-09-26 RX ADMIN — SODIUM CHLORIDE, PRESERVATIVE FREE 10 ML: 5 INJECTION INTRAVENOUS at 18:54

## 2022-09-26 RX ADMIN — SODIUM CHLORIDE 1000 ML: 9 INJECTION, SOLUTION INTRAVENOUS at 16:40

## 2022-09-26 NOTE — CONSULTS
Consult    Patient: Gill Johnson MRN: [id number]  SSN: [id number]    YOB: 1960  Age: 57 y.o. Sex: male      Subjective:      Gill Johnson is a 57 y.o. male who is being seen for dysphagia   Patient is normal service, patient is my office patient, had a history of laryngeal carcinoma,  Patient is on radiation, but today patient sent to the emergency by radiation oncologist.  Keisha Agee to have a PEG tube placement for radiation which was not done, had difficult swallow last couple weeks, patient has lost over 40 pounds I    in the ED patient had blood test done which showed BUN of 80 and creatinine of greater than 2. 3.  he was admitted to hospital for the  dehydrated      Gastroenterologist \was consulted for PEG placement. Past Medical History:   Diagnosis Date    Cancer ([facility] [address]     Diabetes ([facility] [address]     Hypertension      History reviewed. No pertinent surgical history. History reviewed. No pertinent family history.   Social History     Tobacco Use    Smoking status: Not on file    Smokeless tobacco: Not on file   Substance Use Topics    Alcohol use: Not on file      Current Facility-Administered Medications   Medication Dose Route Frequency Provider Last Rate Last Admin    sodium chloride (NS) flush 5-40 mL  5-40 mL IntraVENous Q8H Patria Riley MD        sodium chloride (NS) flush 5-40 mL  5-40 mL IntraVENous PRN Patria Riley MD        acetaminophen (TYLENOL) tablet 650 mg  650 mg Oral Q6H PRN Patria Riley MD        Or    acetaminophen (TYLENOL) suppository 650 mg  650 mg Rectal Q6H PRN Patria Riley MD        polyethylene glycol (MIRALAX) packet 17 g  17 g Oral DAILY PRN Patria Riley MD        ondansetron (ZOFRAN ODT) tablet 4 mg  4 mg Oral Q8H PRN Patria Riley MD        Or    ondansetron [facility]) injection 4 mg  4 mg IntraVENous Q6H PRN Patria Riley MD        [Held by provider] enoxaparin (LOVENOX) injection 30 mg  30 mg SubCUTAneous BID Tamala Dakins, MD        famotidine (PF) (PEPCID) 20 mg in 0.9% sodium chloride 10 mL injection  20 mg IntraVENous Q12H Tamala Dakins, MD        0.9% sodium chloride infusion  125 mL/hr IntraVENous CONTINUOUS Tamala Dakins, MD        maalox/viscous lidocaine/diphenhydramine (GI COCKTAIL) oral solution 30 mL  30 mL Oral ONCE Tamala Dakins, MD        lidocaine (XYLOCAINE) 2 % jelly   Mucous Membrane PRN Tamala Dakins, MD         Current Outpatient Medications   Medication Sig Dispense Refill    gabapentin (NEURONTIN) 300 mg capsule Take 300 mg by mouth three (3) times daily. lisinopriL (PRINIVIL, ZESTRIL) 20 mg tablet Take 20 mg by mouth daily. metoprolol succinate (TOPROL-XL) 50 mg XL tablet Take 50 mg by mouth daily. lidocaine 2 % soln 60 mL, diphenhydrAMINE 12.5 mg/5 mL elix 60 mL, magnesium hydroxide 400 mg/5 mL susp 60 mL Take 5 mL by mouth three (3) times daily as needed. sertraline (ZOLOFT) 50 mg tablet Take 50 mg by mouth nightly. tamsulosin (FLOMAX) 0.4 mg capsule Take 0.4 mg by mouth daily. silver sulfADIAZINE (SILVADENE) 1 % topical cream Apply  to affected area three (3) times daily. Apply to neck          No Known Allergies    Review of Systems:  Review of Systems   Constitutional:  Positive for malaise/fatigue. HENT: Negative. Eyes: Negative. Respiratory: Negative. Gastrointestinal:  Positive for heartburn and nausea. Genitourinary:  Positive for urgency. Musculoskeletal:  Positive for back pain. Neurological:  Positive for weakness. Psychiatric/Behavioral:  Positive for depression. Objective:     Vitals:    09/26/22 1555 09/26/22 1633 09/26/22 1708   BP: 94/64 100/73    Pulse: 88 74 70   Resp: 18 15 14   Temp: 98.3 °F (36.8 °C)     SpO2: 97% 95% 99%   Weight: 106.1 kg (234 lb)     Height: 6' (1.829 m)          Physical Exam:  Physical Exam  Constitutional:       Appearance: He is ill-appearing.    HENT:      Head: Atraumatic. Mouth/Throat:      Mouth: Mucous membranes are dry. Eyes:      General:         Left eye: No discharge. Cardiovascular:      Rate and Rhythm: Regular rhythm. Pulmonary:      Effort: Pulmonary effort is normal.   Abdominal:      General: Abdomen is flat. Bowel sounds are normal.      Tenderness: There is no rebound. Musculoskeletal:      Cervical back: Neck supple. Skin:     General: Skin is warm. Neurological:      Mental Status: He is oriented to person, place, and time. Psychiatric:         Mood and Affect: Mood normal.        Recent Results (from the past 24 hour(s))   CBC WITH AUTOMATED DIFF    Collection Time: 09/26/22  4:35 PM   Result Value Ref Range    WBC 6.6 4.1 - 11.1 K/uL    RBC 4.46 4.10 - 5.70 M/uL    HGB 13.4 12.1 - 17.0 g/dL    HCT 39.2 36.6 - 50.3 %    MCV 87.9 80.0 - 99.0 FL    MCH 30.0 26.0 - 34.0 PG    MCHC 34.2 30.0 - 36.5 g/dL    RDW 13.3 11.5 - 14.5 %    PLATELET 259 998 - 544 K/uL    MPV 9.3 8.9 - 12.9 FL    NRBC 0.0 0.0  WBC    ABSOLUTE NRBC 0.00 0.00 - 0.01 K/uL    NEUTROPHILS 61 32 - 75 %    LYMPHOCYTES 22 12 - 49 %    MONOCYTES 14 (H) 5 - 13 %    EOSINOPHILS 2 0 - 7 %    BASOPHILS 1 0 - 1 %    IMMATURE GRANULOCYTES 0 0 - 0.5 %    ABS. NEUTROPHILS 4.1 1.8 - 8.0 K/UL    ABS. LYMPHOCYTES 1.4 0.8 - 3.5 K/UL    ABS. MONOCYTES 0.9 0.0 - 1.0 K/UL    ABS. EOSINOPHILS 0.1 0.0 - 0.4 K/UL    ABS. BASOPHILS 0.0 0.0 - 0.1 K/UL    ABS. IMM.  GRANS. 0.0 0.00 - 0.04 K/UL    DF AUTOMATED     LACTIC ACID    Collection Time: 09/26/22  4:35 PM   Result Value Ref Range    Lactic acid 1.4 0.4 - 2.0 mmol/L   METABOLIC PANEL, BASIC    Collection Time: 09/26/22  4:35 PM   Result Value Ref Range    Sodium 134 (L) 136 - 145 mmol/L    Potassium 4.0 3.5 - 5.1 mmol/L    Chloride 99 97 - 108 mmol/L    CO2 28 21 - 32 mmol/L    Anion gap 7 5 - 15 mmol/L    Glucose 132 (H) 65 - 100 mg/dL    BUN 80 (H) 6 - 20 mg/dL    Creatinine 2.26 (H) 0.70 - 1.30 mg/dL    BUN/Creatinine ratio 35 (H) 12 - 20      GFR est AA 36 (L) >60 ml/min/1.73m2    GFR est non-AA 29 (L) >60 ml/min/1.73m2    Calcium 9.7 8.5 - 10.1 mg/dL        No orders to display      Assessment:     Hospital Problems  Date Reviewed: 5/4/2020            Codes Class Noted POA    Dehydration ICD-10-CM: E86.0  ICD-9-CM: 276.51  9/26/2022 Unknown         History of laryngeal carcinoma, patient is on the process of radiation  Difficult swallow,  Negative with esophagitis laryngitis from radiation    Weight loss, from above,  Severe dehydration  Plan:   Continue current IV hydrations  Follow renal functions  N.p.o. midnight,    EGD PEG tube placed morning,  Indication, risks, option discussed with patient patient family member, agree with test    Nutrition consultation, home care consultation        Signed By: Nohemy Scott MD     September 26, 2022         Thank you for allowing me to participate in this patients care  Cc Referring Physician   Anika Meadows MD

## 2022-09-26 NOTE — PROGRESS NOTES
Admission Medication Reconciliation:    Information obtained from:  Patient    Comments/Recommendations: Reviewed PTA medications and patient's allergies. Pt states MD d/c'd most of his blood pressure medications and metformin    Pharmacy: [facility]. [place]      Allergies:  Patient has no known allergies. Significant PMH/Disease States:   Past Medical History:   Diagnosis Date    Cancer ([address]     Diabetes ([facility] [address]     Hypertension      Chief Complaint for this Admission:    Chief Complaint   Patient presents with    Lethargy    Other     Prior to Admission Medications:   Prior to Admission Medications   Prescriptions Last Dose Informant Patient Reported? Taking?   gabapentin (NEURONTIN) 300 mg capsule  Self Yes Yes   Sig: Take 300 mg by mouth three (3) times daily. lidocaine 2 % soln 60 mL, diphenhydrAMINE 12.5 mg/5 mL elix 60 mL, magnesium hydroxide 400 mg/5 mL susp 60 mL  Self Yes Yes   Sig: Take 5 mL by mouth three (3) times daily as needed. lisinopriL (PRINIVIL, ZESTRIL) 20 mg tablet  Self Yes Yes   Sig: Take 20 mg by mouth daily. metoprolol succinate (TOPROL-XL) 50 mg XL tablet  Self Yes Yes   Sig: Take 50 mg by mouth daily. sertraline (ZOLOFT) 50 mg tablet  Self Yes Yes   Sig: Take 50 mg by mouth nightly. silver sulfADIAZINE (SILVADENE) 1 % topical cream  Self Yes Yes   Sig: Apply  to affected area three (3) times daily. Apply to neck   tamsulosin (FLOMAX) 0.4 mg capsule  Self Yes Yes   Sig: Take 0.4 mg by mouth daily.       Facility-Administered Medications: None       Catherine Marti

## 2022-09-26 NOTE — ED PROVIDER NOTES
[address]  EMERGENCY DEPARTMENT ENCOUNTER NOTE        Date: 9/26/2022  Patient Name: Lynda Jamil      History of Presenting Illness     Chief Complaint   Patient presents with    Lethargy    Other       History Provided By: Patient    HPI: Lynda Jamil, 57 y.o. male with PMH of diabetes, HTN, and oropharyngeal cancer presents to the ED with failure to thrive. Patient has been having significant pain with swallowing which prompted him to come to the ED today. Over the course of the last several months, patient lost approximately 40 pounds. He is disclosing pain at his throat where he is getting radiation therapy. Worsened by swallowing without specific alleviating factors with no associate additional symptoms. Patient reports that feeling tired and fatigued due to decreased oral intake. His denying abdominal pain, nausea, vomiting, chest pain or shortness of breath. No fever, runny nose or nasal congestion. Tried Magic mouth at home without significant improvement. There are no other complaints, changes, or physical findings at this time.     PCP: Ellyn Torres MD    Current Facility-Administered Medications   Medication Dose Route Frequency Provider Last Rate Last Admin    sodium chloride (NS) flush 5-40 mL  5-40 mL IntraVENous Q8H Luis Conde MD   10 mL at 09/26/22 1854    sodium chloride (NS) flush 5-40 mL  5-40 mL IntraVENous PRN Luis Conde MD        acetaminophen (TYLENOL) tablet 650 mg  650 mg Oral Q6H PRN Luis Conde MD        Or    acetaminophen (TYLENOL) suppository 650 mg  650 mg Rectal Q6H PRN Luis Conde MD        polyethylene glycol (MIRALAX) packet 17 g  17 g Oral DAILY PRN Luis Conde MD        ondansetron (ZOFRAN ODT) tablet 4 mg  4 mg Oral Q8H PRN Luis Conde MD        Or    ondansetron [facility]) injection 4 mg  4 mg IntraVENous Q6H PRARVIND Conde MD        [Held by provider] enoxaparin (LOVENOX) injection 30 mg  30 mg SubCUTAneous BID Marly Izquierdo MD        famotidine (PF) (PEPCID) 20 mg in 0.9% sodium chloride 10 mL injection  20 mg IntraVENous Q12H Marly Izquierdo MD        0.9% sodium chloride infusion  125 mL/hr IntraVENous CONTINUOUS Marly Izquierdo MD        lidocaine (XYLOCAINE) 2 % jelly   Mucous Membrane PRN Marly Izquierdo MD         Current Outpatient Medications   Medication Sig Dispense Refill    gabapentin (NEURONTIN) 300 mg capsule Take 300 mg by mouth three (3) times daily. lisinopriL (PRINIVIL, ZESTRIL) 20 mg tablet Take 20 mg by mouth daily. metoprolol succinate (TOPROL-XL) 50 mg XL tablet Take 50 mg by mouth daily. lidocaine 2 % soln 60 mL, diphenhydrAMINE 12.5 mg/5 mL elix 60 mL, magnesium hydroxide 400 mg/5 mL susp 60 mL Take 5 mL by mouth three (3) times daily as needed. sertraline (ZOLOFT) 50 mg tablet Take 50 mg by mouth nightly. tamsulosin (FLOMAX) 0.4 mg capsule Take 0.4 mg by mouth daily. silver sulfADIAZINE (SILVADENE) 1 % topical cream Apply  to affected area three (3) times daily. Apply to neck         Past History     Past Medical History:  Past Medical History:   Diagnosis Date    Cancer ([facility] [address]     Diabetes ([facility] [address]     Hypertension        Past Surgical History:  History reviewed. No pertinent surgical history. Family History:  History reviewed. No pertinent family history. Social History: Allergies:  No Known Allergies      Review of Systems     Review of Systems    A 10 point review of system was performed and was negative except as noted above in HPI    Physical Exam     Physical Exam  Vitals and nursing note reviewed. Constitutional:       General: He is not in acute distress. Appearance: He is not ill-appearing, toxic-appearing or diaphoretic. HENT:      Head: Normocephalic and atraumatic. Mouth/Throat:      Mouth: Mucous membranes are dry.    Neck:      Comments: Postradiation changes to the skin of the neck anteriorly  Cardiovascular:      Rate and Rhythm: Normal rate and regular rhythm. Heart sounds: Normal heart sounds. Pulmonary:      Effort: Pulmonary effort is normal.      Breath sounds: Normal breath sounds. Abdominal:      Palpations: Abdomen is soft. Tenderness: There is no abdominal tenderness. Musculoskeletal:      Cervical back: Normal range of motion and neck supple. Right lower leg: No tenderness. No edema. Left lower leg: No tenderness. No edema. Skin:     General: Skin is warm and dry. Neurological:      Mental Status: He is alert and oriented to person, place, and time. Lab and Diagnostic Study Results     Labs -     Recent Results (from the past 12 hour(s))   CBC WITH AUTOMATED DIFF    Collection Time: 09/26/22  4:35 PM   Result Value Ref Range    WBC 6.6 4.1 - 11.1 K/uL    RBC 4.46 4.10 - 5.70 M/uL    HGB 13.4 12.1 - 17.0 g/dL    HCT 39.2 36.6 - 50.3 %    MCV 87.9 80.0 - 99.0 FL    MCH 30.0 26.0 - 34.0 PG    MCHC 34.2 30.0 - 36.5 g/dL    RDW 13.3 11.5 - 14.5 %    PLATELET 022 108 - 054 K/uL    MPV 9.3 8.9 - 12.9 FL    NRBC 0.0 0.0  WBC    ABSOLUTE NRBC 0.00 0.00 - 0.01 K/uL    NEUTROPHILS 61 32 - 75 %    LYMPHOCYTES 22 12 - 49 %    MONOCYTES 14 (H) 5 - 13 %    EOSINOPHILS 2 0 - 7 %    BASOPHILS 1 0 - 1 %    IMMATURE GRANULOCYTES 0 0 - 0.5 %    ABS. NEUTROPHILS 4.1 1.8 - 8.0 K/UL    ABS. LYMPHOCYTES 1.4 0.8 - 3.5 K/UL    ABS. MONOCYTES 0.9 0.0 - 1.0 K/UL    ABS. EOSINOPHILS 0.1 0.0 - 0.4 K/UL    ABS. BASOPHILS 0.0 0.0 - 0.1 K/UL    ABS. IMM.  GRANS. 0.0 0.00 - 0.04 K/UL    DF AUTOMATED     LACTIC ACID    Collection Time: 09/26/22  4:35 PM   Result Value Ref Range    Lactic acid 1.4 0.4 - 2.0 mmol/L   METABOLIC PANEL, BASIC    Collection Time: 09/26/22  4:35 PM   Result Value Ref Range    Sodium 134 (L) 136 - 145 mmol/L    Potassium 4.0 3.5 - 5.1 mmol/L    Chloride 99 97 - 108 mmol/L    CO2 28 21 - 32 mmol/L    Anion gap 7 5 - 15 mmol/L Glucose 132 (H) 65 - 100 mg/dL    BUN 80 (H) 6 - 20 mg/dL    Creatinine 2.26 (H) 0.70 - 1.30 mg/dL    BUN/Creatinine ratio 35 (H) 12 - 20      GFR est AA 36 (L) >60 ml/min/1.73m2    GFR est non-AA 29 (L) >60 ml/min/1.73m2    Calcium 9.7 8.5 - 10.1 mg/dL       Radiologic Studies -   [unfilled]  CT Results  (Last 48 hours)      None          CXR Results  (Last 48 hours)      None            Medical Decision Making and ED Course   - I am the first and primary provider for this patient AND AM THE PRIMARY PROVIDER OF RECORD. - I reviewed the vital signs, available nursing notes, past medical history, past surgical history, family history and social history. - Initial assessment performed. The patients presenting problems have been discussed, and the staff are in agreement with the care plan formulated and outlined with them. I have encouraged them to ask questions as they arise throughout their visit. Vital Signs-Reviewed the patient's vital signs. Patient Vitals for the past 24 hrs:   Temp Pulse Resp BP SpO2   09/26/22 1847 -- 68 15 -- 93 %   09/26/22 1845 -- 70 15 -- 96 %   09/26/22 1825 -- 74 15 -- 99 %   09/26/22 1715 -- 70 10 126/70 99 %   09/26/22 1708 -- 70 14 -- 99 %   09/26/22 1633 -- 74 15 100/73 95 %   09/26/22 1555 98.3 °F (36.8 °C) 88 18 94/64 97 %       Records Reviewed: Nursing Notes    Provider Notes (Medical Decision Making):     Patient is a pleasant 40-year-old gentleman with past medical history of oropharyngeal cancer currently undergoing radiotherapy presents to the ED with dysphagia. He is reporting discomfort when his swallowing is been going for quite some time. He has been having decreased oral intake in terms of solids and liquids. He tried Magic mouth without significant improvement of symptoms. His examination revealed signs of dehydration otherwise no significant other finding aside from skin changes around his neck related to the radiation therapy.   Labs were obtained including CBC and chemistry which showed acute kidney injury. Patient was started on intravenous rehydration and was admitted to the hospital for acute kidney injury and failure to thrive. Diagnosis     Clinical Impression:   1. JEFFRY (acute kidney injury) ([address]    2. FTT (failure to thrive) in adult          Disposition     Disposition: Condition stable    Admitted        Attestations: Ramon Gamboa MD    Please note that this dictation was completed with [employer], the computer voice recognition software. Quite often unanticipated grammatical, syntax, homophones, and other interpretive errors are inadvertently transcribed by the computer software. Please disregard these errors. Please excuse any errors that have escaped final proofreading. Thank you.

## 2022-09-26 NOTE — H&P
History & Physical    Primary Care Provider: Andres Anders MD  Source of Information: Patient self/wife    History of Presenting Illness:   Jenny Meza is a 57 y.o. male with history of oropharyngeal carcinoma on radiation therapy presenting to the ED for decreased oral intake and significant dehydration. He was sent over from cancer center by radiation oncologist.  Per wife, patient has lost over 40 pounds in the last 5 weeks due to difficulties with swallowing. He was advised PEG tube before but did not pursue it. Evaluated in the ED and noted with a BUN of 80 and creatinine of greater than 2.3. Appears clinically dehydrated and will be admitted for further IV fluid hydration. Gastroenterologist will be consulted for PEG placement. Patient and wife are agreeable       Review of Systems:  A comprehensive review of systems was negative except for that written in the History of Present Illness. Past Medical History:   Diagnosis Date    Cancer ([facility] [address]     Diabetes ([facility] [address]     Hypertension       History reviewed. No pertinent surgical history. Prior to Admission medications    Medication Sig Start Date End Date Taking? Authorizing Provider   gabapentin (NEURONTIN) 300 mg capsule Take 300 mg by mouth three (3) times daily. Yes Provider, Historical   lisinopriL (PRINIVIL, ZESTRIL) 20 mg tablet Take 20 mg by mouth daily. Yes Provider, Historical   metoprolol succinate (TOPROL-XL) 50 mg XL tablet Take 50 mg by mouth daily. Yes Provider, Historical   lidocaine 2 % soln 60 mL, diphenhydrAMINE 12.5 mg/5 mL elix 60 mL, magnesium hydroxide 400 mg/5 mL susp 60 mL Take 5 mL by mouth three (3) times daily as needed. Yes Provider, Historical     No Known Allergies   History reviewed. No pertinent family history.      SOCIAL HISTORY:  Patient resides:  Independently    Assisted Living    SNF    With family care x      Smoking history:   None    Former x   Chronic Alcohol history:   None x   Social    Chronic      Ambulates:   Independently x   w/cane    w/walker    w/wc    CODE STATUS:  DNR    Full x   Other      Objective:     Physical Exam:     Visit Vitals  /73   Pulse 70   Temp 98.3 °F (36.8 °C)   Resp 14   Ht 6' (1.829 m)   Wt 106.1 kg (234 lb)   SpO2 99%   BMI 31.74 kg/m²      O2 Device: None (Room air)    General:  Alert, cooperative, no distress, appears stated age. Head:  Normocephalic, without obvious abnormality, atraumatic. Eyes:  Conjunctivae/corneas clear. PERRL, EOMs intact. Nose: Nares normal. Septum midline. Mucosa normal. No drainage or sinus tenderness. Throat: Lips, mucosa, and tongue normal. Teeth and gums normal.   Neck: Supple, symmetrical, trachea midline, no adenopathy, thyroid: no enlargement/tenderness/nodules, no carotid bruit and no JVD,Diffuse erythema   Back:   Symmetric, no curvature. ROM normal. No CVA tenderness. Lungs:   Clear to auscultation bilaterally. Chest wall:  No tenderness or deformity. Heart:  Regular rate and rhythm, S1, S2 normal, no murmur, click, rub or gallop. Abdomen:   Soft, non-tender. Bowel sounds normal. No masses,  No organomegaly. Extremities: Extremities normal, atraumatic, no cyanosis or edema. Pulses: 2+ and symmetric all extremities. Skin: Skin color, texture, turgor normal. No rashes or lesions   Neurologic: CNII-XII intact. No motor or sensory deficits. EKG:  nonspecific ST and T waves changes. Data Review:     Recent Days:  Recent Labs     09/26/22  1635   WBC 6.6   HGB 13.4   HCT 39.2        Recent Labs     09/26/22  1635   *   K 4.0   CL 99   CO2 28   *   BUN 80*   CREA 2.26*   CA 9.7     No results for input(s): PH, PCO2, PO2, HCO3, FIO2 in the last 72 hours.     24 Hour Results:  Recent Results (from the past 24 hour(s))   CBC WITH AUTOMATED DIFF    Collection Time: 09/26/22  4:35 PM   Result Value Ref Range    WBC 6.6 4.1 - 11.1 K/uL    RBC 4. 46 4.10 - 5.70 M/uL    HGB 13.4 12.1 - 17.0 g/dL    HCT 39.2 36.6 - 50.3 %    MCV 87.9 80.0 - 99.0 FL    MCH 30.0 26.0 - 34.0 PG    MCHC 34.2 30.0 - 36.5 g/dL    RDW 13.3 11.5 - 14.5 %    PLATELET 639 256 - 418 K/uL    MPV 9.3 8.9 - 12.9 FL    NRBC 0.0 0.0  WBC    ABSOLUTE NRBC 0.00 0.00 - 0.01 K/uL    NEUTROPHILS 61 32 - 75 %    LYMPHOCYTES 22 12 - 49 %    MONOCYTES 14 (H) 5 - 13 %    EOSINOPHILS 2 0 - 7 %    BASOPHILS 1 0 - 1 %    IMMATURE GRANULOCYTES 0 0 - 0.5 %    ABS. NEUTROPHILS 4.1 1.8 - 8.0 K/UL    ABS. LYMPHOCYTES 1.4 0.8 - 3.5 K/UL    ABS. MONOCYTES 0.9 0.0 - 1.0 K/UL    ABS. EOSINOPHILS 0.1 0.0 - 0.4 K/UL    ABS. BASOPHILS 0.0 0.0 - 0.1 K/UL    ABS. IMM. GRANS. 0.0 0.00 - 0.04 K/UL    DF AUTOMATED     LACTIC ACID    Collection Time: 09/26/22  4:35 PM   Result Value Ref Range    Lactic acid 1.4 0.4 - 2.0 mmol/L   METABOLIC PANEL, BASIC    Collection Time: 09/26/22  4:35 PM   Result Value Ref Range    Sodium 134 (L) 136 - 145 mmol/L    Potassium 4.0 3.5 - 5.1 mmol/L    Chloride 99 97 - 108 mmol/L    CO2 28 21 - 32 mmol/L    Anion gap 7 5 - 15 mmol/L    Glucose 132 (H) 65 - 100 mg/dL    BUN 80 (H) 6 - 20 mg/dL    Creatinine 2.26 (H) 0.70 - 1.30 mg/dL    BUN/Creatinine ratio 35 (H) 12 - 20      GFR est AA 36 (L) >60 ml/min/1.73m2    GFR est non-AA 29 (L) >60 ml/min/1.73m2    Calcium 9.7 8.5 - 10.1 mg/dL         Imaging:   No orders to display         Assessment:     Severe dehydration    -Due to poor oral intake    -IV normal saline at 125 cc an hour    -Begin full liquid diet only    -Keep patient n.p.o. after midnight for PEG tube in a.m. Acute kidney injury    -Due to dehydration and poor oral intake    -Continue IV fluids    Oral pharyngeal carcinoma    -Completed 5 weeks of radiation therapy.   1 more week of therapy left    -Obtain radiation oncology eval    Benign essential hypertension    -Fairly stable    -We will hold antihypertensives    Weight loss, unintentional    -Lost  40 pounds over the last 5 weeks    -Will benefit from PEG placement with additional nutrition supplements    Oral mucositis    -Secondary to radiation therapy for oropharyngeal carcinoma    -We will add lidocaine swish and swallow    Plan:   Admit to medical telemetry floor inpatient  Treatment plan as discussed above  Plan of care discussed with patient and wife  CODE STATUS discussed  He is full code      Signed By: Marlen Thompson MD     September 26, 2022

## 2022-09-26 NOTE — ED TRIAGE NOTES
Pt states he was at the cancer center receiving radiation today for his vocal cord cancer and his doctor sent him over due to not eating or drinking. Wife states pt has lost 10lbs over the last week and a total or 44 lbs in 5 weeks. Pt has no complaints at this time.

## 2022-09-27 ENCOUNTER — APPOINTMENT (OUTPATIENT)
Dept: PHYSICAL THERAPY | Age: 62
End: 2022-09-27
Payer: COMMERCIAL

## 2022-09-27 ENCOUNTER — ANESTHESIA (OUTPATIENT)
Dept: ENDOSCOPY | Age: 62
DRG: 640 | End: 2022-09-27
Payer: COMMERCIAL

## 2022-09-27 ENCOUNTER — ANESTHESIA EVENT (OUTPATIENT)
Dept: ENDOSCOPY | Age: 62
DRG: 640 | End: 2022-09-27
Payer: COMMERCIAL

## 2022-09-27 ENCOUNTER — APPOINTMENT (OUTPATIENT)
Dept: RADIATION THERAPY | Age: 62
End: 2022-09-27
Payer: COMMERCIAL

## 2022-09-27 ENCOUNTER — APPOINTMENT (OUTPATIENT)
Dept: ENDOSCOPY | Age: 62
DRG: 640 | End: 2022-09-27
Attending: INTERNAL MEDICINE
Payer: COMMERCIAL

## 2022-09-27 PROBLEM — E43 PROTEIN-CALORIE MALNUTRITION, SEVERE (HCC): Status: ACTIVE | Noted: 2022-09-27

## 2022-09-27 LAB
ANION GAP SERPL CALC-SCNC: 6 MMOL/L (ref 5–15)
BUN SERPL-MCNC: 62 MG/DL (ref 6–20)
BUN/CREAT SERPL: 34 (ref 12–20)
CA-I BLD-MCNC: 9.3 MG/DL (ref 8.5–10.1)
CHLORIDE SERPL-SCNC: 105 MMOL/L (ref 97–108)
CO2 SERPL-SCNC: 29 MMOL/L (ref 21–32)
CREAT SERPL-MCNC: 1.81 MG/DL (ref 0.7–1.3)
ERYTHROCYTE [DISTWIDTH] IN BLOOD BY AUTOMATED COUNT: 13.3 % (ref 11.5–14.5)
GLUCOSE SERPL-MCNC: 108 MG/DL (ref 65–100)
HCT VFR BLD AUTO: 36.9 % (ref 36.6–50.3)
HGB BLD-MCNC: 12.1 G/DL (ref 12.1–17)
MCH RBC QN AUTO: 29.5 PG (ref 26–34)
MCHC RBC AUTO-ENTMCNC: 32.8 G/DL (ref 30–36.5)
MCV RBC AUTO: 90 FL (ref 80–99)
NRBC # BLD: 0 K/UL (ref 0–0.01)
NRBC BLD-RTO: 0 PER 100 WBC
PLATELET # BLD AUTO: 272 K/UL (ref 150–400)
PMV BLD AUTO: 9.6 FL (ref 8.9–12.9)
POTASSIUM SERPL-SCNC: 4.3 MMOL/L (ref 3.5–5.1)
RBC # BLD AUTO: 4.1 M/UL (ref 4.1–5.7)
SODIUM SERPL-SCNC: 140 MMOL/L (ref 136–145)
WBC # BLD AUTO: 5.7 K/UL (ref 4.1–11.1)

## 2022-09-27 PROCEDURE — 74011250636 HC RX REV CODE- 250/636: Performed by: ANESTHESIOLOGY

## 2022-09-27 PROCEDURE — 85027 COMPLETE CBC AUTOMATED: CPT

## 2022-09-27 PROCEDURE — 65270000029 HC RM PRIVATE

## 2022-09-27 PROCEDURE — 80048 BASIC METABOLIC PNL TOTAL CA: CPT

## 2022-09-27 PROCEDURE — 2709999900 HC NON-CHARGEABLE SUPPLY: Performed by: INTERNAL MEDICINE

## 2022-09-27 PROCEDURE — 74011000250 HC RX REV CODE- 250: Performed by: INTERNAL MEDICINE

## 2022-09-27 PROCEDURE — 77030005122 HC CATH GASTMY PEG BSC -B: Performed by: INTERNAL MEDICINE

## 2022-09-27 PROCEDURE — 76040000007: Performed by: INTERNAL MEDICINE

## 2022-09-27 PROCEDURE — 74011250636 HC RX REV CODE- 250/636: Performed by: INTERNAL MEDICINE

## 2022-09-27 PROCEDURE — 36415 COLL VENOUS BLD VENIPUNCTURE: CPT

## 2022-09-27 PROCEDURE — 0DH63UZ INSERTION OF FEEDING DEVICE INTO STOMACH, PERCUTANEOUS APPROACH: ICD-10-PCS | Performed by: INTERNAL MEDICINE

## 2022-09-27 PROCEDURE — 76060000032 HC ANESTHESIA 0.5 TO 1 HR: Performed by: INTERNAL MEDICINE

## 2022-09-27 RX ORDER — PROPOFOL 10 MG/ML
INJECTION, EMULSION INTRAVENOUS
Status: COMPLETED
Start: 2022-09-27 | End: 2022-09-27

## 2022-09-27 RX ORDER — CEFAZOLIN SODIUM 1 G/3ML
INJECTION, POWDER, FOR SOLUTION INTRAMUSCULAR; INTRAVENOUS
Status: DISPENSED
Start: 2022-09-27 | End: 2022-09-28

## 2022-09-27 RX ORDER — CEFAZOLIN SODIUM 1 G/3ML
INJECTION, POWDER, FOR SOLUTION INTRAMUSCULAR; INTRAVENOUS AS NEEDED
Status: DISCONTINUED | OUTPATIENT
Start: 2022-09-27 | End: 2022-09-27 | Stop reason: HOSPADM

## 2022-09-27 RX ORDER — PROPOFOL 10 MG/ML
INJECTION, EMULSION INTRAVENOUS AS NEEDED
Status: DISCONTINUED | OUTPATIENT
Start: 2022-09-27 | End: 2022-09-27 | Stop reason: HOSPADM

## 2022-09-27 RX ADMIN — SODIUM CHLORIDE 125 ML/HR: 9 INJECTION, SOLUTION INTRAVENOUS at 03:56

## 2022-09-27 RX ADMIN — PROPOFOL 100 MG: 10 INJECTION, EMULSION INTRAVENOUS at 13:43

## 2022-09-27 RX ADMIN — SODIUM CHLORIDE, PRESERVATIVE FREE 20 MG: 5 INJECTION INTRAVENOUS at 20:57

## 2022-09-27 RX ADMIN — PROPOFOL 60 MG: 10 INJECTION, EMULSION INTRAVENOUS at 13:46

## 2022-09-27 RX ADMIN — PROPOFOL 50 MG: 10 INJECTION, EMULSION INTRAVENOUS at 13:25

## 2022-09-27 RX ADMIN — SODIUM CHLORIDE, PRESERVATIVE FREE 20 MG: 5 INJECTION INTRAVENOUS at 08:50

## 2022-09-27 RX ADMIN — SILVER SULFADIAZINE: 10 CREAM TOPICAL at 21:00

## 2022-09-27 RX ADMIN — CEFAZOLIN SODIUM 2 G: 1 INJECTION, POWDER, FOR SOLUTION INTRAMUSCULAR; INTRAVENOUS at 13:39

## 2022-09-27 RX ADMIN — SILVER SULFADIAZINE: 10 CREAM TOPICAL at 15:23

## 2022-09-27 RX ADMIN — SODIUM CHLORIDE, PRESERVATIVE FREE 10 ML: 5 INJECTION INTRAVENOUS at 14:33

## 2022-09-27 RX ADMIN — SODIUM CHLORIDE, PRESERVATIVE FREE 10 ML: 5 INJECTION INTRAVENOUS at 08:51

## 2022-09-27 RX ADMIN — PROPOFOL 100 MG: 10 INJECTION, EMULSION INTRAVENOUS at 13:41

## 2022-09-27 RX ADMIN — SODIUM CHLORIDE, PRESERVATIVE FREE 10 ML: 5 INJECTION INTRAVENOUS at 21:39

## 2022-09-27 RX ADMIN — SILVER SULFADIAZINE: 10 CREAM TOPICAL at 08:51

## 2022-09-27 RX ADMIN — PROPOFOL 30 MG: 10 INJECTION, EMULSION INTRAVENOUS at 13:50

## 2022-09-27 NOTE — ANESTHESIA PREPROCEDURE EVALUATION
Relevant Problems   No relevant active problems       Anesthetic History               Review of Systems / Medical History  Patient summary reviewed, nursing notes reviewed and pertinent labs reviewed    Pulmonary                   Neuro/Psych              Cardiovascular    Hypertension                   GI/Hepatic/Renal               Comments: DYSPHAGIA. DEHYDRATION.   Endo/Other    Diabetes    Obesity and cancer (OROPHARYNGEAL CA)     Other Findings                   Anesthetic Plan    ASA: 3  Anesthesia type: MAC          Induction: Intravenous  Anesthetic plan and risks discussed with: Patient

## 2022-09-27 NOTE — ANESTHESIA POSTPROCEDURE EVALUATION
Procedure(s):  ESOPHAGOGASTRODUODENOSCOPY (EGD)  PERCUTANEOUS ENDOSCOPIC GASTROSTOMY TUBE INSERTION. MAC    Anesthesia Post Evaluation      Multimodal analgesia: multimodal analgesia not used between 6 hours prior to anesthesia start to PACU discharge  Patient location during evaluation: bedside (Endoscopy suite)  Patient participation: complete - patient cannot participate  Level of consciousness: sleepy but conscious  Pain score: 0  Pain management: adequate  Airway patency: patent  Anesthetic complications: no  Cardiovascular status: acceptable  Respiratory status: acceptable and nasal cannula  Hydration status: acceptable  Comments: This patient remained on the stretcher. The patient was handed off to the endoscopy nursing team.  All questions regarding pre-, intra-, and postoperative care were answered.   Post anesthesia nausea and vomiting:  none      INITIAL Post-op Vital signs:   Vitals Value Taken Time   /77 09/27/22 1356   Temp     Pulse 85 09/27/22 1356   Resp 18 09/27/22 1356   SpO2 99 % 09/27/22 1356

## 2022-09-27 NOTE — PROGRESS NOTES
1415 - patient returned to floor from endo. Responds appropriately to voice. IV intact and infusing NS @ 125. Vital signs documented and stable. No concerns. 1420 - report taken from PACU. 24f mid abdominal PEG placed. 2g ancef given. Give meds through PEG in 6 hours, feeds through PEG in 8 hours. No issues during procedure.

## 2022-09-27 NOTE — PROGRESS NOTES
Comprehensive Nutrition Assessment    Type and Reason for Visit: Positive nutrition screen    Nutrition Recommendations/Plan:   Initiate bolus feed   TwoCalHN 6xday with 150mL h2O flush before and after   Provides 2844kcal (100%), 119g prot (86%), 2864mL h2O (100%)  2. Monitor for continued wt changes     Malnutrition Assessment:  Malnutrition Status:  Severe malnutrition (09/27/22 1710)    Context:  Chronic illness     Findings of the 6 clinical characteristics of malnutrition:   Energy Intake:  75% or less est energy requirements for 1 month or longer  Weight Loss:  Greater than 5% over 1 month (45#x5 weeks (16% significant))     Body Fat Loss:  Unable to assess,     Muscle Mass Loss:  Unable to assess,    Fluid Accumulation:  Unable to assess,     Strength:  Not performed     Nutrition Assessment:    Admitted for dehydration. Remains inpt post-op PEG placement. Pt endorses gradually decreasing appetite and intakes with 45# wt loss x5 weeks pta. Per pts wife, pain while swallowing is d/t oropharyngeal CA radiation tx. RD edu on bolus feed regimen while at home, pt and wife demonstrated understanding. Labs: BUN 62, creat 1.81, gluc 108. Meds: pepcid, zofran, miralax. Nutrition Related Findings:    NFPE deferred d/t pt education. +C/+S difficulties d/t raditation tx. No +D/-C, last BM 09/26. Wound Type: Open wounds (d/t radiation)    Current Nutrition Intake & Therapies:  Average Meal Intake: NPO  Average Supplement Intake: Unable to assess  DIET NPO    Anthropometric Measures:  Height: 6' (182.9 cm)  Ideal Body Weight (IBW): 178 lbs (81 kg)  Admission Body Weight: 234 lb  Current Body Wt:  106.1 kg (234 lb), 131.5 % IBW. Bed scale  Current BMI (kg/m2): 31.7        Weight Adjustment: No adjustment                 BMI Category: Obese class 1 (BMI 30.0-34. 9)    Estimated Daily Nutrient Needs:  Energy Requirements Based On: Kcal/kg  Weight Used for Energy Requirements: Current  Energy (kcal/day): 2864kcals/day (27kcal/kg)  Weight Used for Protein Requirements: Current  Protein (g/day): 137g/day (1.3g/kg)  Method Used for Fluid Requirements: ml/kg  Fluid (ml/day): 2864mL    Nutrition Diagnosis:   Severe malnutrition, In context of chronic illness related to catabolic illness, swallowing difficulty as evidenced by poor intake prior to admission, weight loss greater than or equal to 5% in 1 month (45#x5wks)    Nutrition Interventions:   Food and/or Nutrient Delivery: Start tube feeding  Nutrition Education/Counseling: Education completed (PEG bolus feeds)  Coordination of Nutrition Care: Coordination of care, No recommendation at this time  Plan of Care discussed with: pt and wife    Goals:     Goals:  Tolerate nutrition support at goal rate, Initiate nutrition support, by next RD assessment       Nutrition Monitoring and Evaluation:   Behavioral-Environmental Outcomes: None identified  Food/Nutrient Intake Outcomes: Food and nutrient intake, Enteral nutrition intake/tolerance, Diet advancement/tolerance  Physical Signs/Symptoms Outcomes: Chewing or swallowing, Meal time behavior, Weight    Discharge Planning:    Enteral nutrition    Remy Ba  Contact: [phone]

## 2022-09-27 NOTE — PROGRESS NOTES
Nutrition Education    Educated on PEG tube bolus feedings   Learners: Patient and Significant Other  Readiness: Eager  Method: Explanation, Demonstration, and Handout  Response: Verbalizes Understanding  Contact name and number provided.     Devang Novoa  Contact Number: [phone]

## 2022-09-27 NOTE — PROGRESS NOTES
Reason for Admission:  lethargy, painful swallowing                      RUR Score:       11%              Plan for utilizing home health:      open to receiving; no preference     PCP: First and Last name:  Jayda Skinner MD     Name of Practice:    Are you a current patient: Yes/No: Yes   Approximate date of last visit: 09/23/2022   Can you participate in a virtual visit with your PCP:                     Current Advanced Directive/Advance Care Plan: Full Code  CM confirmed code status with patient. Healthcare Decision Maker:   Click here to complete [address] including selection of the Healthcare Decision Maker Relationship (ie \"Primary\")           Jeromy Kunz (wife) [phone]                  Transition of Care Plan:                      CM met with patient to complete DCP assessment. Patient lives with his wife in a single story home with 4 steps to enter and egress. He ambulates independently and completes ADLs without assistance. Patient drives himself everywhere. His home pharmacy is [facility].. He is open to receiving home health services, and has no preference for a home health company. CM will continue to follow.

## 2022-09-27 NOTE — DISCHARGE SUMMARY
Hospitalist Discharge Summary     Patient ID:    Salomón Varghese  [id number]  22 y.o.  1960    Admit date: 9/26/2022    Discharge date : 9/28/2022 after PEG      Final Diagnoses: Active Problems:    Dehydration (9/26/2022)      Protein-calorie malnutrition, severe ([address] (9/27/2022)      Reason for Hospitalization:  Salomón Smoker is a 57 y.o. male with history of oropharyngeal carcinoma on radiation therapy presenting to the ED for decreased oral intake and significant dehydration. He was sent over from cancer center by radiation oncologist.  Per wife, patient has lost over 40 pounds in the last 5 weeks due to difficulties with swallowing. He was advised PEG tube before but did not pursue it. Evaluated in the ED and noted with a BUN of 80 and creatinine of greater than 2.3. Appears clinically dehydrated and will be admitted for further IV fluid hydration. Gastroenterologist will be consulted for PEG placement. Patient and wife are agreeable    Hospital Course:   IVF  C/s GI --> PEG    Certain meds changed due to PEG; see list below    Clinically otherwise stable. Discharge Medications:   Current Discharge Medication List        START taking these medications    Details   metoprolol tartrate (LOPRESSOR) 50 mg tablet 1 Tablet by Per G Tube route two (2) times a day. Qty: 60 Tablet, Refills: 0  Start date: 9/28/2022      prazosin (MINIPRESS) 1 mg capsule 1 Capsule by Per G Tube route nightly. Indications: enlarged prostate with urination problem  Qty: 30 Capsule, Refills: 0  Start date: 9/28/2022           CONTINUE these medications which have CHANGED    Details   sertraline (ZOLOFT) 50 mg tablet 1 Tablet by Per G Tube route nightly. Qty: 30 Tablet, Refills: 0  Start date: 9/28/2022           CONTINUE these medications which have NOT CHANGED    Details   gabapentin (NEURONTIN) 300 mg capsule Take 300 mg by mouth three (3) times daily.       lidocaine 2 % soln 60 mL, diphenhydrAMINE 12.5 mg/5 mL elix 60 mL, magnesium hydroxide 400 mg/5 mL susp 60 mL Take 5 mL by mouth three (3) times daily as needed. silver sulfADIAZINE (SILVADENE) 1 % topical cream Apply  to affected area three (3) times daily. Apply to neck           STOP taking these medications       lisinopriL (PRINIVIL, ZESTRIL) 20 mg tablet Comments:   Reason for Stopping:         metoprolol succinate (TOPROL-XL) 50 mg XL tablet Comments:   Reason for Stopping:         tamsulosin (FLOMAX) 0.4 mg capsule Comments:   Reason for Stopping: Follow up Care:    1. Donis Beltran MD in 1-2 weeks. Follow-up Information       Follow up With Specialties Details Why [address], [address] [address] [phone]                Patient Follow Up Instructions: Activity: Activity as tolerated  Diet:  PEG feeding as per GI     Condition at Discharge:  Stable  __________________________________________________________________    Disposition  [address] [facility]  ____________________________________________________________________    Code Status:  Full Code  ___________________________________________________________________    Discharge Exam:  Patient seen and examined by me on discharge day. Pertinent Findings:  Gen:    Not in distress  Chest: Clear lungs  CVS:   Regular rhythm.   No edema  Abd:  Soft, not distended, not tender  Neuro:  Alert    CONSULTATIONS: GI    Significant Diagnostic Studies:   Recent Results (from the past 24 hour(s))   METABOLIC PANEL, BASIC    Collection Time: 09/28/22  6:05 AM   Result Value Ref Range    Sodium 141 136 - 145 mmol/L    Potassium 4.1 3.5 - 5.1 mmol/L    Chloride 108 97 - 108 mmol/L    CO2 27 21 - 32 mmol/L    Anion gap 6 5 - 15 mmol/L    Glucose 98 65 - 100 mg/dL    BUN 33 (H) 6 - 20 mg/dL    Creatinine 1.29 0.70 - 1.30 mg/dL    BUN/Creatinine ratio 26 (H) 12 - 20      GFR est AA >60 >60 ml/min/1.73m2 GFR est non-AA 56 (L) >60 ml/min/1.73m2    Calcium 8.6 8.5 - 10.1 mg/dL   CBC W/O DIFF    Collection Time: 09/28/22  6:05 AM   Result Value Ref Range    WBC 6.8 4.1 - 11.1 K/uL    RBC 3.86 (L) 4.10 - 5.70 M/uL    HGB 11.7 (L) 12.1 - 17.0 g/dL    HCT 34.9 (L) 36.6 - 50.3 %    MCV 90.4 80.0 - 99.0 FL    MCH 30.3 26.0 - 34.0 PG    MCHC 33.5 30.0 - 36.5 g/dL    RDW 13.4 11.5 - 14.5 %    PLATELET 941 244 - 018 K/uL    MPV 9.9 8.9 - 12.9 FL    NRBC 0.0 0.0  WBC    ABSOLUTE NRBC 0.00 0.00 - 0.01 K/uL     No orders to display           Signed:  Shiva Ponce MD  9/28/2022  11:33 AM

## 2022-09-28 ENCOUNTER — HOSPITAL ENCOUNTER (OUTPATIENT)
Dept: RADIATION THERAPY | Age: 62
Discharge: HOME | End: 2022-09-28
Payer: COMMERCIAL

## 2022-09-28 VITALS
SYSTOLIC BLOOD PRESSURE: 131 MMHG | HEIGHT: 72 IN | WEIGHT: 234 LBS | TEMPERATURE: 97.8 F | DIASTOLIC BLOOD PRESSURE: 79 MMHG | RESPIRATION RATE: 18 BRPM | BODY MASS INDEX: 31.69 KG/M2 | HEART RATE: 78 BPM | OXYGEN SATURATION: 99 %

## 2022-09-28 LAB
ANION GAP SERPL CALC-SCNC: 6 MMOL/L (ref 5–15)
BUN SERPL-MCNC: 33 MG/DL (ref 6–20)
BUN/CREAT SERPL: 26 (ref 12–20)
CA-I BLD-MCNC: 8.6 MG/DL (ref 8.5–10.1)
CHLORIDE SERPL-SCNC: 108 MMOL/L (ref 97–108)
CO2 SERPL-SCNC: 27 MMOL/L (ref 21–32)
CREAT SERPL-MCNC: 1.29 MG/DL (ref 0.7–1.3)
ERYTHROCYTE [DISTWIDTH] IN BLOOD BY AUTOMATED COUNT: 13.4 % (ref 11.5–14.5)
GLUCOSE SERPL-MCNC: 98 MG/DL (ref 65–100)
HCT VFR BLD AUTO: 34.9 % (ref 36.6–50.3)
HGB BLD-MCNC: 11.7 G/DL (ref 12.1–17)
MCH RBC QN AUTO: 30.3 PG (ref 26–34)
MCHC RBC AUTO-ENTMCNC: 33.5 G/DL (ref 30–36.5)
MCV RBC AUTO: 90.4 FL (ref 80–99)
NRBC # BLD: 0 K/UL (ref 0–0.01)
NRBC BLD-RTO: 0 PER 100 WBC
PLATELET # BLD AUTO: 257 K/UL (ref 150–400)
PMV BLD AUTO: 9.9 FL (ref 8.9–12.9)
POTASSIUM SERPL-SCNC: 4.1 MMOL/L (ref 3.5–5.1)
RBC # BLD AUTO: 3.86 M/UL (ref 4.1–5.7)
SODIUM SERPL-SCNC: 141 MMOL/L (ref 136–145)
WBC # BLD AUTO: 6.8 K/UL (ref 4.1–11.1)

## 2022-09-28 PROCEDURE — 80048 BASIC METABOLIC PNL TOTAL CA: CPT

## 2022-09-28 PROCEDURE — 36415 COLL VENOUS BLD VENIPUNCTURE: CPT

## 2022-09-28 PROCEDURE — 85027 COMPLETE CBC AUTOMATED: CPT

## 2022-09-28 PROCEDURE — 74011250636 HC RX REV CODE- 250/636: Performed by: INTERNAL MEDICINE

## 2022-09-28 PROCEDURE — 74011000250 HC RX REV CODE- 250: Performed by: INTERNAL MEDICINE

## 2022-09-28 PROCEDURE — 77412 RADIATION TX DELIVERY LVL 3: CPT

## 2022-09-28 RX ORDER — PRAZOSIN HYDROCHLORIDE 1 MG/1
1 CAPSULE ORAL
Status: DISCONTINUED | OUTPATIENT
Start: 2022-09-28 | End: 2022-09-28

## 2022-09-28 RX ORDER — TAMSULOSIN HYDROCHLORIDE 0.4 MG/1
0.4 CAPSULE ORAL DAILY
Status: DISCONTINUED | OUTPATIENT
Start: 2022-09-28 | End: 2022-09-28

## 2022-09-28 RX ORDER — METOPROLOL TARTRATE 50 MG/1
50 TABLET ORAL 2 TIMES DAILY
Status: DISCONTINUED | OUTPATIENT
Start: 2022-09-28 | End: 2022-09-28 | Stop reason: HOSPADM

## 2022-09-28 RX ORDER — SERTRALINE HYDROCHLORIDE 50 MG/1
50 TABLET, FILM COATED ORAL
Qty: 30 TABLET | Refills: 0 | Status: SHIPPED | OUTPATIENT
Start: 2022-09-28

## 2022-09-28 RX ORDER — PRAZOSIN HYDROCHLORIDE 1 MG/1
1 CAPSULE ORAL
Status: DISCONTINUED | OUTPATIENT
Start: 2022-09-28 | End: 2022-09-28 | Stop reason: HOSPADM

## 2022-09-28 RX ORDER — PRAZOSIN HYDROCHLORIDE 1 MG/1
1 CAPSULE ORAL
Qty: 30 CAPSULE | Refills: 0 | Status: SHIPPED | OUTPATIENT
Start: 2022-09-28

## 2022-09-28 RX ORDER — METOPROLOL SUCCINATE 50 MG/1
50 TABLET, EXTENDED RELEASE ORAL DAILY
Status: DISCONTINUED | OUTPATIENT
Start: 2022-09-29 | End: 2022-09-28

## 2022-09-28 RX ORDER — GABAPENTIN 300 MG/1
300 CAPSULE ORAL 3 TIMES DAILY
Status: DISCONTINUED | OUTPATIENT
Start: 2022-09-28 | End: 2022-09-28 | Stop reason: HOSPADM

## 2022-09-28 RX ORDER — SERTRALINE HYDROCHLORIDE 50 MG/1
50 TABLET, FILM COATED ORAL
Status: DISCONTINUED | OUTPATIENT
Start: 2022-09-28 | End: 2022-09-28 | Stop reason: HOSPADM

## 2022-09-28 RX ORDER — METOPROLOL TARTRATE 50 MG/1
50 TABLET ORAL 2 TIMES DAILY
Qty: 60 TABLET | Refills: 0 | Status: SHIPPED | OUTPATIENT
Start: 2022-09-28

## 2022-09-28 RX ORDER — LISINOPRIL 20 MG/1
20 TABLET ORAL DAILY
Status: DISCONTINUED | OUTPATIENT
Start: 2022-09-29 | End: 2022-09-28

## 2022-09-28 RX ORDER — LISINOPRIL 20 MG/1
20 TABLET ORAL DAILY
Qty: 30 TABLET | Refills: 0 | Status: SHIPPED | OUTPATIENT
Start: 2022-09-29 | End: 2022-09-28

## 2022-09-28 RX ADMIN — SILVER SULFADIAZINE: 10 CREAM TOPICAL at 08:36

## 2022-09-28 RX ADMIN — SODIUM CHLORIDE, PRESERVATIVE FREE 20 MG: 5 INJECTION INTRAVENOUS at 08:36

## 2022-09-28 RX ADMIN — SODIUM CHLORIDE, PRESERVATIVE FREE 10 ML: 5 INJECTION INTRAVENOUS at 06:13

## 2022-09-28 NOTE — PROGRESS NOTES
S/p PEG yesterday but not discharged d/t pain with TF  No other changes over night  Nutritionist consulted to assess    No other acute symptom at present  No CP, SOB, N/V.     Discussed with nursing    AxOx3  VS  NAD  Abd soft; PEG in place  Lungs CTA  S1S2  Coherent    CBC stable  Cr 1.29     JEFFRY resolved  Cont XRT as before    Plan: DC home as planned

## 2022-09-28 NOTE — PROGRESS NOTES
0000: patient experiencing pain in his abd. Patient is refusing bolus feeds at this time. RN checked residual, 15 ml.

## 2022-09-28 NOTE — PROGRESS NOTES
VSS. Patient given discharge instructions. Medications and follow-up appointments reviewed. IV and Telemetry removed. Case management, provider, and primary nurse aware of discharge. Discharge plan of care/case management plan validated with provider discharge order. Patient taken to car via wheelchair.

## 2022-09-28 NOTE — PROGRESS NOTES
Progress Note    Patient: Deonna Katz MRN: [id number]  SSN: [id number]    YOB: 1960  Age: 57 y.o.   Sex: male      Admit Date: 9/26/2022    LOS: 2 days     Subjective:     He has some abdominal pain with tube feeding with bolus feeding, no nausea vomiting,  Past Medical History:   Diagnosis Date    Cancer ([facility]     Diabetes ([facility]     Hypertension         Current Facility-Administered Medications:     gabapentin (NEURONTIN) capsule 300 mg, 300 mg, Oral, TID, Linad Patino MD    [START ON 9/29/2022] lisinopriL (PRINIVIL, ZESTRIL) tablet 20 mg, 20 mg, Oral, DAILY, Linda Maurice MD    [START ON 9/29/2022] metoprolol succinate (TOPROL-XL) XL tablet 50 mg, 50 mg, Oral, DAILY, Linda Maurice MD    sertraline (ZOLOFT) tablet 50 mg, 50 mg, Oral, QHS, Linda Maurice MD    tamsulosin (FLOMAX) capsule 0.4 mg, 0.4 mg, Oral, DAILY, Linda Maurice MD    sodium chloride (NS) flush 5-40 mL, 5-40 mL, IntraVENous, Q8H, Manuel Amaral MD, 10 mL at 09/28/22 7533    sodium chloride (NS) flush 5-40 mL, 5-40 mL, IntraVENous, PRN, Manuel Amaral MD    acetaminophen (TYLENOL) tablet 650 mg, 650 mg, Oral, Q6H PRN **OR** acetaminophen (TYLENOL) suppository 650 mg, 650 mg, Rectal, Q6H PRN, Manuel Amaral MD    polyethylene glycol (MIRALAX) packet 17 g, 17 g, Oral, DAILY PRN, Manuel Amaral MD    ondansetron (ZOFRAN ODT) tablet 4 mg, 4 mg, Oral, Q8H PRN **OR** ondansetron (ZOFRAN) injection 4 mg, 4 mg, IntraVENous, Q6H PRN, Manuel Amaral MD    [Held by provider] enoxaparin (LOVENOX) injection 30 mg, 30 mg, SubCUTAneous, BID, Manuel Amaral MD    famotidine (PF) (PEPCID) 20 mg in 0.9% sodium chloride 10 mL injection, 20 mg, IntraVENous, Q12H, Manuel Amaral MD, 20 mg at 09/28/22 0836    0.9% sodium chloride infusion, 125 mL/hr, IntraVENous, CONTINUOUS, Manuel Amaral MD, Last Rate: 125 mL/hr at 09/27/22 1333, NoRateChange at 09/27/22 1333    lidocaine (XYLOCAINE) 2 % jelly, , Mucous Membrane, PRN, Karrie Fair MD    silver sulfADIAZINE (SILVADENE) 1 % topical cream, , Topical, TID, Mary Blackwood MD, Given at 09/28/22 7022    Objective:     Vitals:    09/27/22 2000 09/27/22 2054 09/28/22 0430 09/28/22 0822   BP:  137/76 126/67 131/79   Pulse: 90 90 66 78   Resp:  20 20 18   Temp:  97.9 °F (36.6 °C) 97.9 °F (36.6 °C) 97.8 °F (36.6 °C)   SpO2:  99% 99% 99%   Weight:       Height:            Intake and Output:  Current Shift: No intake/output data recorded. Last three shifts: 09/26 1901 - 09/28 0700  In: 150   Out: 1050 [Urine:1050]    Physical Exam:   Physical Exam  Constitutional:       Appearance: He is ill-appearing. HENT:      Head: Atraumatic. Mouth/Throat:      Mouth: Mucous membranes are dry. Neck:      Comments: Skin edema,. Cardiovascular:      Rate and Rhythm: Normal rate. Heart sounds: Normal heart sounds. Pulmonary:      Breath sounds: Normal breath sounds. Abdominal:      General: Abdomen is flat. Comments: Peg skin looks fine   Musculoskeletal:         General: Normal range of motion. Skin:     Findings: Erythema, lesion and rash present. Neurological:      Mental Status: He is oriented to person, place, and time. Mental status is at baseline.    Psychiatric:         Behavior: Behavior normal.        Lab/Data Review:  Recent Results (from the past 24 hour(s))   METABOLIC PANEL, BASIC    Collection Time: 09/28/22  6:05 AM   Result Value Ref Range    Sodium 141 136 - 145 mmol/L    Potassium 4.1 3.5 - 5.1 mmol/L    Chloride 108 97 - 108 mmol/L    CO2 27 21 - 32 mmol/L    Anion gap 6 5 - 15 mmol/L    Glucose 98 65 - 100 mg/dL    BUN 33 (H) 6 - 20 mg/dL    Creatinine 1.29 0.70 - 1.30 mg/dL    BUN/Creatinine ratio 26 (H) 12 - 20      GFR est AA >60 >60 ml/min/1.73m2    GFR est non-AA 56 (L) >60 ml/min/1.73m2    Calcium 8.6 8.5 - 10.1 mg/dL   CBC W/O DIFF    Collection Time: 09/28/22  6:05 AM   Result Value Ref Range    WBC 6.8 4.1 - 11.1 K/uL    RBC 3.86 (L) 4.10 - 5.70 M/uL    HGB 11.7 (L) 12.1 - 17.0 g/dL    HCT 34.9 (L) 36.6 - 50.3 %    MCV 90.4 80.0 - 99.0 FL    MCH 30.3 26.0 - 34.0 PG    MCHC 33.5 30.0 - 36.5 g/dL    RDW 13.4 11.5 - 14.5 %    PLATELET 033 271 - 304 K/uL    MPV 9.9 8.9 - 12.9 FL    NRBC 0.0 0.0  WBC    ABSOLUTE NRBC 0.00 0.00 - 0.01 K/uL        No orders to display        Assessment:     Active Problems:    Dehydration (9/26/2022)      Protein-calorie malnutrition, severe ([facility] (9/27/2022)      S/p PEG tube placement  Some abdominal pain after feeding  Plan:   Use the room temperature feeding fluids  May need continuous feeding, he will continuous feeding if feeding pain persists    Use tube for medication intaking  Nutrition follow-up patient  Case management to arrange the home care,    Signed By: Aracelis Gonzales MD     September 28, 2022        Thank you for allowing me to participate in this patients care  Cc Referring Physician   Brendon South MD

## 2022-09-29 ENCOUNTER — HOSPITAL ENCOUNTER (OUTPATIENT)
Dept: RADIATION THERAPY | Age: 62
Discharge: HOME | End: 2022-09-29
Payer: COMMERCIAL

## 2022-09-29 PROCEDURE — 77412 RADIATION TX DELIVERY LVL 3: CPT

## 2022-09-30 ENCOUNTER — HOSPITAL ENCOUNTER (OUTPATIENT)
Dept: RADIATION THERAPY | Age: 62
Discharge: HOME | End: 2022-09-30
Payer: COMMERCIAL

## 2022-09-30 PROCEDURE — 77412 RADIATION TX DELIVERY LVL 3: CPT

## 2022-10-03 ENCOUNTER — HOSPITAL ENCOUNTER (OUTPATIENT)
Dept: RADIATION THERAPY | Age: 62
Discharge: HOME | End: 2022-10-03
Payer: COMMERCIAL

## 2022-10-03 PROCEDURE — 77336 RADIATION PHYSICS CONSULT: CPT

## 2022-10-03 PROCEDURE — 77412 RADIATION TX DELIVERY LVL 3: CPT

## 2022-10-06 ENCOUNTER — APPOINTMENT (OUTPATIENT)
Dept: PHYSICAL THERAPY | Age: 62
End: 2022-10-06

## 2022-10-26 PROBLEM — E86.0 DEHYDRATION: Status: RESOLVED | Noted: 2022-09-26 | Resolved: 2022-10-26

## 2022-10-28 ENCOUNTER — HOSPITAL ENCOUNTER (OUTPATIENT)
Dept: RADIATION THERAPY | Age: 62
Discharge: HOME | End: 2022-10-28

## 2022-11-01 ENCOUNTER — TRANSCRIBE ORDER (OUTPATIENT)
Dept: SCHEDULING | Age: 62
End: 2022-11-01

## 2022-11-01 DIAGNOSIS — C32.0 MALIGNANT NEOPLASM OF GLOTTIS (HCC): Primary | ICD-10-CM

## 2022-11-22 ENCOUNTER — HOSPITAL ENCOUNTER (OUTPATIENT)
Dept: CT IMAGING | Age: 62
Discharge: HOME | End: 2022-11-22
Attending: SPECIALIST
Payer: COMMERCIAL

## 2022-11-22 DIAGNOSIS — C32.0 MALIGNANT NEOPLASM OF GLOTTIS (HCC): ICD-10-CM

## 2022-11-22 LAB — CREAT BLD-MCNC: 1.1 MG/DL (ref 0.6–1.3)

## 2022-11-22 PROCEDURE — 70491 CT SOFT TISSUE NECK W/DYE: CPT

## 2022-11-22 PROCEDURE — 74011000636 HC RX REV CODE- 636: Performed by: SPECIALIST

## 2022-11-22 PROCEDURE — 82565 ASSAY OF CREATININE: CPT

## 2022-11-22 RX ADMIN — IOPAMIDOL 100 ML: 755 INJECTION, SOLUTION INTRAVENOUS at 15:37

## 2022-11-23 ENCOUNTER — HOSPITAL ENCOUNTER (OUTPATIENT)
Age: 62
Setting detail: OUTPATIENT SURGERY
Discharge: HOME | End: 2022-11-23
Attending: INTERNAL MEDICINE | Admitting: INTERNAL MEDICINE
Payer: COMMERCIAL

## 2022-11-23 ENCOUNTER — APPOINTMENT (OUTPATIENT)
Dept: ENDOSCOPY | Age: 62
End: 2022-11-23
Attending: INTERNAL MEDICINE
Payer: COMMERCIAL

## 2022-11-23 VITALS
OXYGEN SATURATION: 98 % | DIASTOLIC BLOOD PRESSURE: 76 MMHG | SYSTOLIC BLOOD PRESSURE: 150 MMHG | HEART RATE: 55 BPM | TEMPERATURE: 97.8 F | RESPIRATION RATE: 18 BRPM

## 2022-11-23 PROCEDURE — 77030019988 HC FCPS ENDOSC DISP BSC -B: Performed by: INTERNAL MEDICINE

## 2022-11-23 PROCEDURE — 76040000019: Performed by: INTERNAL MEDICINE

## 2022-11-23 PROCEDURE — 76060000031 HC ANESTHESIA FIRST 0.5 HR: Performed by: INTERNAL MEDICINE

## 2022-11-23 PROCEDURE — 2709999900 HC NON-CHARGEABLE SUPPLY: Performed by: INTERNAL MEDICINE

## 2022-11-23 RX ORDER — SODIUM CHLORIDE 9 MG/ML
20 INJECTION, SOLUTION INTRAVENOUS CONTINUOUS
Status: DISCONTINUED | OUTPATIENT
Start: 2022-11-23 | End: 2022-11-23 | Stop reason: HOSPADM

## 2022-11-23 RX ORDER — SODIUM CHLORIDE, SODIUM LACTATE, POTASSIUM CHLORIDE, CALCIUM CHLORIDE 600; 310; 30; 20 MG/100ML; MG/100ML; MG/100ML; MG/100ML
50 INJECTION, SOLUTION INTRAVENOUS CONTINUOUS
Status: DISCONTINUED | OUTPATIENT
Start: 2022-11-23 | End: 2022-11-23 | Stop reason: HOSPADM

## 2022-11-23 NOTE — OP NOTES
Operative Note    Patient: Rebecca Harley  YOB: 1960  MRN: [id number]    Date of Procedure: 11/23/2022     Pre-Op Diagnosis: DYSPHAGIA    Post-Op Diagnosis:   Dysphagia    Procedure(s):  PERCUTANEOUS ENDOSCOPIC GASTROSTOMY TUBE REMOVAL    Surgeon(s):  Ana Harris MD    Surgical Assistant: None    Anesthesia: MAC     Estimated Blood Loss (mL):  Minimal    Complications: None    Specimens: * No specimens in log *     Implants: * No implants in log *    Drains: * No LDAs found *    Detailed Description of Procedure:    physical performed before procedure, indication/ risk discussed with patient, patient signed consent form,  medication reviewed,   PEG tube removed by end of tube to the end of tube, tube removal.  but with minimal bleeding, minimal pain,  Dress applied to the site,    Recommendation:  Continue current medications  Small meals today  Take shower tomorrow  Change dress daily  Positive site still leaking after 2 weeks      Electronically Signed by Juliano Calvin MD on 11/23/2022 at 2:24 PM

## 2022-11-23 NOTE — PERIOP NOTES
Patient alert and oriented x4, VS stable, no complaints of pain at this time. Wife at bedside. Bed in low position, call bell within reach.

## 2022-12-22 NOTE — THERAPY DISCHARGE
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 Called patient and left voicemail to follow-up. Patient has not called to re-schedule. Patient will be discharged from [address] [place] [address] services at this time. Please obtain new referral if patient would like to continue w/ outpatient ST. Short Term Goals: To be accomplished in 4-6 treatments [NOT MET]  -Establish prophylactic swallowing exercises to preserve and maintain swallow function   -Modify diet as tolerated to reduce risk of aspiration and nutritional decline   -MBS as indicated   -On-going assessment of oral and pharyngeal changes s/p radiation tx   -Tolerate LRD w/o overt s/sx of aspiration/penetration and adequate nutritional intake      Long Term Goals:  To be accomplished in 8-12 treatments  [NOT MET]  -Maintain p.o. intake of least restrictive diet w/o clinical indicators of penetration/aspiration and adequate nutritional intake  -Preserve and maintain swallow function w/ use of oral and pharyngeal swallowing exercises and compensatory swallow strategies.   -Increase percentage of MD. Michael Gip Dysphagia Inventory by 5%      RECOMMENDATIONS:  [x]Discontinue therapy: []Patient has reached or is progressing toward set goals     []Patient is non-compliant or has abdicated     []Due to lack of appreciable progress towards set goals     [x]Other hospitalization    LEXI Boswell 12/22/2022

## 2022-12-29 ENCOUNTER — TRANSCRIBE ORDER (OUTPATIENT)
Dept: SCHEDULING | Age: 62
End: 2022-12-29

## 2022-12-29 DIAGNOSIS — K21.9 LARYNGOPHARYNGEAL REFLUX: ICD-10-CM

## 2022-12-29 DIAGNOSIS — C32.0 SQUAMOUS CELL CARCINOMA OF LEFT VOCAL CORD (HCC): ICD-10-CM

## 2022-12-29 DIAGNOSIS — Z78.9 NON-SMOKER: ICD-10-CM

## 2022-12-29 DIAGNOSIS — H61.20 CERUMEN IMPACTION: ICD-10-CM

## 2022-12-29 DIAGNOSIS — Z00.8 OTHER SPECIFIED GENERAL MEDICAL EXAMINATION: Primary | ICD-10-CM

## 2023-01-23 ENCOUNTER — HOSPITAL ENCOUNTER (OUTPATIENT)
Dept: PET IMAGING | Age: 63
Discharge: HOME | End: 2023-01-23
Payer: COMMERCIAL

## 2023-01-23 DIAGNOSIS — K21.9 LARYNGOPHARYNGEAL REFLUX: ICD-10-CM

## 2023-01-23 DIAGNOSIS — C32.0 SQUAMOUS CELL CARCINOMA OF LEFT VOCAL CORD (HCC): ICD-10-CM

## 2023-01-23 DIAGNOSIS — Z00.8 OTHER SPECIFIED GENERAL MEDICAL EXAMINATION: ICD-10-CM

## 2023-01-23 DIAGNOSIS — H61.20 CERUMEN IMPACTION: ICD-10-CM

## 2023-01-23 DIAGNOSIS — Z78.9 NON-SMOKER: ICD-10-CM

## 2023-01-23 PROCEDURE — A9552 F18 FDG: HCPCS

## 2023-01-23 RX ADMIN — FLUDEOXYGLUCOSE F-18 9.7 MILLICURIE: 200 INJECTION INTRAVENOUS at 15:05

## 2023-01-24 RX ORDER — FLUDEOXYGLUCOSE F-18 200 MCI/ML
9.7 INJECTION INTRAVENOUS ONCE
Status: COMPLETED | OUTPATIENT
Start: 2023-01-24 | End: 2023-01-23

## 2023-03-07 ENCOUNTER — HOSPITAL ENCOUNTER (OUTPATIENT)
Dept: RADIATION THERAPY | Age: 63
Discharge: HOME | End: 2023-03-07

## 2023-03-28 ENCOUNTER — TRANSCRIBE ORDER (OUTPATIENT)
Dept: SCHEDULING | Age: 63
End: 2023-03-28

## 2023-03-28 DIAGNOSIS — K21.9 LARYNGOPHARYNGEAL REFLUX: ICD-10-CM

## 2023-03-28 DIAGNOSIS — Z00.8 OTHER SPECIFIED GENERAL MEDICAL EXAMINATION: Primary | ICD-10-CM

## 2023-03-28 DIAGNOSIS — R13.10 DYSPHAGIA: ICD-10-CM

## 2023-03-28 DIAGNOSIS — C32.0 SQUAMOUS CELL CARCINOMA OF LEFT VOCAL CORD (HCC): ICD-10-CM

## 2023-04-03 ENCOUNTER — HOSPITAL ENCOUNTER (OUTPATIENT)
Dept: GENERAL RADIOLOGY | Age: 63
End: 2023-04-03
Payer: COMMERCIAL

## 2023-04-03 DIAGNOSIS — C32.0 SQUAMOUS CELL CARCINOMA OF LEFT VOCAL CORD (HCC): ICD-10-CM

## 2023-04-03 DIAGNOSIS — R13.10 DYSPHAGIA: ICD-10-CM

## 2023-04-03 DIAGNOSIS — K21.9 LARYNGOPHARYNGEAL REFLUX: ICD-10-CM

## 2023-04-03 DIAGNOSIS — Z00.8 OTHER SPECIFIED GENERAL MEDICAL EXAMINATION: ICD-10-CM

## 2023-04-03 PROCEDURE — 74011000250 HC RX REV CODE- 250: Performed by: OTOLARYNGOLOGY

## 2023-04-03 PROCEDURE — 74230 X-RAY XM SWLNG FUNCJ C+: CPT

## 2023-04-03 PROCEDURE — 92611 MOTION FLUOROSCOPY/SWALLOW: CPT

## 2023-04-03 RX ADMIN — BARIUM SULFATE 20 ML: 400 SUSPENSION ORAL at 11:09

## 2023-04-03 RX ADMIN — BARIUM SULFATE 25 ML: 400 SUSPENSION ORAL at 11:09

## 2023-04-03 RX ADMIN — BARIUM SULFATE 120 ML: 0.81 POWDER, FOR SUSPENSION ORAL at 11:09

## 2023-04-03 RX ADMIN — BARIUM SULFATE 15 ML: 400 PASTE ORAL at 11:10

## 2023-04-03 NOTE — THERAPY EVALUATION
[address] [facility] STUDY  Patient: Alysha Mccormick (75 y.o. 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 Oral phase c/b WFL. Pharyngeal phase c/b short/blunted appearance of epiglottis with some edematous presentation noted in the pyriform/laryngeal/UES region. There is reduced epiglottic inversion and airway protection with reduced degree and duration of UES relaxation. Deficits result in penetration to level of TVF with thin and nectar via cup, which is worsened with use of straw. Penetration is minimal and only to level of laryngeal vestibule with honey. No pen/asp observed with pudding. Mild aspiration with solid residue due to pyriform overflow from reduced UES clearance during swallow. Trace aspiration with thin via right head turn. No improvement in penetration with chin tuck or left head turn. With each episode of pen/asp, pt with immediate and effective cough. Pt does report what seems like a possible laryngospasm in response to persistent pen/asp and cough. This improves with prompts for pursed-lip breathing. Recommend ENT follow-up. Reduced degree and duration of UES relaxation with possible edematous appearance. Patient will benefit from skilled intervention to address the above impairments. Patients rehabilitation potential is considered to be Fair     PLAN :  Recommendations and Planned Interventions:   At this time, recommend puree/minced and moist solids with moderately (honey) thick liquids with single sips, alternate solids and liquids, slow rate, upright during and after all PO Intake, monitor pt closely for s/s aspiration, GERD and aspiration precautions. Recommend resume outpt SLP intervention for dysphagia s/p XRT. Recommend ongoing ENT follow-up for management and monitoring of pharyngeal and laryngeal anatomy. Reviewed with pt at length re: obtaining and use of thickener, demonstrated use and appropriate thickness. Questions addressed. Pt is scheduled for upcoming outpt Barium Esophagram, pt's tolerance of this testing is suspected to be limited based on MBS results. Recommendations: Outpatient     SUBJECTIVE:   Patient seen for outpt MBS, alert, agreeable. Pt arrives independently and provides information. Pt with hx of left TVF carcinoma s/p XRT. Pt had PEG placed 10/2022 which has since been removed. Pt reports has been eating ground/pureed foods and thin liquids with coughing and choking every time he eats or drinks. Pt was previously seen by SLP 9/2022 with recs for puree/ground textures and thin with chin tuck. Prior MBS has not been completed. MBS purpose and protocol discussed with pt prior to initiation of study. OBJECTIVE:     Past Medical History:   Diagnosis Date    Cancer ([facility] [address]     Diabetes ([facility] [address]     Hypertension    No past surgical history on file. Radiologist:   Video Flouroscopic Procedures  [x] Lateral View   [] A-P View [] Scanned to level of Sternum    [x] Seated at 90 deg. [] Other:    Presentation:   [x] Spoon   [x] Cup   [x] Straw   [] Syringe   [x] Consecutive Swallows  [] Other:    Consistencies:   [x] Ba+ liquid   [x] Ba+ liquid (nectar)   [x] Ba+ liquid (honey)     [x] Ba+ pudding   [] Ba+ crunched cookie   [x] Ba+ cracker   [] Other:     Testing Discontinued: [] Due to:    Treatment Techniques Attempted     [x] Head Turn: [x] Right [x] Left     [] Head Tilt: [] Right [] Left     [x] Chin Down:  [] Thermal Sensitization:  [] Supraglottic Swallow:  [] Mendelson's Maneuver:  [x] Other: no improvement or increased pen/asp with use of head turn and chin tuck maneuvers. Results  Dysphagia Present:    [x] Yes  [] No    Ratings of Dysphagia:    [] Mild  [x] Moderate [] Severe    Stages of Breakdown:   [] Oral [x] Pharyngeal   [] Esophageal    Aspiration: [x] Yes    [] No  [] At Risk  [] Trace (<10%) [] Significant (>10%):     %  [x] Penetration:  Level of:at or above TVF   Cough: [x] Yes, effective      [] No    Consistency Aspirated:  [x] Thin Liquid   [] Nectar   [] Honey   [] Pureed     [] Mech-soft  [x] Solid    Motility Problems with:  [] Lip Closure:   [] Sucking:   [] Mastication:   [] Bolus Formation:   [] Bolus Control:  [] A-P Transport:  [] Posterior Tongue Elevation:  [] Swallow Response (delayed):  [] Velopharyngeal Closure:  [x] Epiglottic inversion  [] Laryngeal Elevation:  [] Laryngeal Adduction:  [x] Cricopharyngeal Relaxation:  [] Esophageal Peristalsis:  [] Reflux:  [] Other:    Timing of Aspiration:  [] Before Swallow:  [x] During Swallow:  [] After Swallow:    Transit Time Delay:  [] >1 Second  Oral  [] >1 Second Pharyngeal  [] >20 Second Esophageal     Residuals:  [] Buccal Cavity   [] Velum/posterior pharyngeal wall  [] Valleculae  [] Pyriforms      COMMUNICATION/EDUCATION:   Patient receptive of education regarding MBS results, diet recs, swallow safety precautions, use of and obtaining thickener, appropriate thickness (pt provided with packet of mod thick thickener for reference). Patient demonstrated Good understanding as evidenced by verbal responsiveness, teach back.     Thank you for this referral.  Jax Streeter M.S., CCC-SLP

## 2023-04-10 ENCOUNTER — HOSPITAL ENCOUNTER (OUTPATIENT)
Dept: GENERAL RADIOLOGY | Age: 63
Discharge: HOME | End: 2023-04-10
Payer: COMMERCIAL

## 2023-04-10 DIAGNOSIS — C32.0 SQUAMOUS CELL CARCINOMA OF LEFT VOCAL CORD (HCC): ICD-10-CM

## 2023-04-10 DIAGNOSIS — Z00.8 OTHER SPECIFIED GENERAL MEDICAL EXAMINATION: ICD-10-CM

## 2023-04-10 DIAGNOSIS — K21.9 LARYNGOPHARYNGEAL REFLUX: ICD-10-CM

## 2023-04-10 DIAGNOSIS — R13.10 DYSPHAGIA: ICD-10-CM

## 2023-04-10 PROCEDURE — 74220 X-RAY XM ESOPHAGUS 1CNTRST: CPT

## 2023-04-10 PROCEDURE — 74011000250 HC RX REV CODE- 250: Performed by: OTOLARYNGOLOGY

## 2023-04-10 RX ADMIN — BARIUM SULFATE 700 MG: 700 TABLET ORAL at 11:53

## 2023-04-10 RX ADMIN — BARIUM SULFATE 45 ML: 980 POWDER, FOR SUSPENSION ORAL at 11:55

## 2023-04-10 RX ADMIN — BARIUM SULFATE 118 ML: 0.6 SUSPENSION ORAL at 11:55

## 2023-04-13 ENCOUNTER — HOSPITAL ENCOUNTER (OUTPATIENT)
Dept: PHYSICAL THERAPY | Age: 63
End: 2023-04-13
Payer: COMMERCIAL

## 2023-04-18 ENCOUNTER — HOSPITAL ENCOUNTER (OUTPATIENT)
Dept: PHYSICAL THERAPY | Age: 63
Discharge: HOME | End: 2023-04-18
Payer: COMMERCIAL

## 2023-04-18 PROCEDURE — 92610 EVALUATE SWALLOWING FUNCTION: CPT

## 2023-04-25 ENCOUNTER — APPOINTMENT (OUTPATIENT)
Dept: PHYSICAL THERAPY | Age: 63
End: 2023-04-25
Payer: COMMERCIAL

## 2023-05-25 RX ORDER — METOPROLOL TARTRATE 50 MG/1
50 TABLET, FILM COATED ORAL 2 TIMES DAILY
COMMUNITY
Start: 2022-09-28

## 2023-05-25 RX ORDER — GABAPENTIN 300 MG/1
300 CAPSULE ORAL 3 TIMES DAILY
COMMUNITY

## 2023-05-25 RX ORDER — PRAZOSIN HYDROCHLORIDE 1 MG/1
1 CAPSULE ORAL
COMMUNITY
Start: 2022-09-28

## 2023-10-12 ENCOUNTER — CLINICAL DOCUMENTATION (OUTPATIENT)
Facility: HOSPITAL | Age: 63
End: 2023-10-12

## 2023-10-26 ENCOUNTER — HOSPITAL ENCOUNTER (OUTPATIENT)
Facility: HOSPITAL | Age: 63
Discharge: HOME | End: 2023-10-26
Payer: COMMERCIAL

## 2023-10-26 DIAGNOSIS — C32.0 SQUAMOUS CELL CARCINOMA OF LEFT VOCAL CORD (HCC): ICD-10-CM

## 2023-10-26 DIAGNOSIS — Z00.00 GENERAL MEDICAL EXAMINATION: ICD-10-CM

## 2023-10-26 DIAGNOSIS — R13.10 DYSPHAGIA, UNSPECIFIED TYPE: ICD-10-CM

## 2023-10-26 LAB — CREAT BLD-MCNC: 1.2 MG/DL (ref 0.6–1.3)

## 2023-10-26 PROCEDURE — 6360000004 HC RX CONTRAST MEDICATION: Performed by: OTOLARYNGOLOGY

## 2023-10-26 PROCEDURE — 70491 CT SOFT TISSUE NECK W/DYE: CPT

## 2023-10-26 PROCEDURE — 82565 ASSAY OF CREATININE: CPT

## 2023-10-26 RX ADMIN — IOPAMIDOL 100 ML: 755 INJECTION, SOLUTION INTRAVENOUS at 09:42

## 2024-02-02 ENCOUNTER — HOSPITAL ENCOUNTER (OUTPATIENT)
Facility: HOSPITAL | Age: 64
Discharge: HOME | End: 2024-02-02
Attending: FAMILY MEDICINE
Payer: COMMERCIAL

## 2024-02-02 DIAGNOSIS — Z87.891 PERSONAL HISTORY OF NICOTINE DEPENDENCE: ICD-10-CM

## 2024-02-02 PROCEDURE — 71271 CT THORAX LUNG CANCER SCR C-: CPT

## 2025-01-09 ENCOUNTER — TRANSCRIBE ORDERS (OUTPATIENT)
Facility: HOSPITAL | Age: 65
End: 2025-01-09

## 2025-01-09 DIAGNOSIS — C32.0 MALIGNANT NEOPLASM OF GLOTTIS (HCC): Primary | ICD-10-CM

## 2025-04-10 ENCOUNTER — TRANSCRIBE ORDERS (OUTPATIENT)
Facility: HOSPITAL | Age: 65
End: 2025-04-10

## 2025-04-10 DIAGNOSIS — Z12.2 ENCOUNTER FOR SCREENING FOR MALIGNANT NEOPLASM OF RESPIRATORY ORGANS: Primary | ICD-10-CM

## 2025-04-26 ENCOUNTER — HOSPITAL ENCOUNTER (OUTPATIENT)
Facility: HOSPITAL | Age: 65
Discharge: HOME | End: 2025-04-29
Attending: FAMILY MEDICINE
Payer: MEDICARE

## 2025-04-26 DIAGNOSIS — Z12.2 ENCOUNTER FOR SCREENING FOR MALIGNANT NEOPLASM OF RESPIRATORY ORGANS: ICD-10-CM

## 2025-04-26 PROCEDURE — 71271 CT THORAX LUNG CANCER SCR C-: CPT

## (undated) DEVICE — MOUTHPIECE ENDOSCP 20X27MM --

## (undated) DEVICE — FORCEPS BX L240CM JAW DIA2.4MM ORNG L CAP W/ NDL DISP RAD

## (undated) DEVICE — CATH IV AUTOGRD BLU 22GA 25MM -- INSYTE-N

## (undated) DEVICE — CATH IV AUTOGRD PNK 20GA 25MM -- INSYTE-N

## (undated) DEVICE — THE ENDO CARRY-ON PROCEDURE KIT CONTAINS ALL OF THE SUPPLIES AND INFECTION PREVENTION PRODUCTS NEEDED FOR ENDOSCOPIC PROCEDURES: Brand: ENDO CARRY-ON PROCEDURE KIT

## (undated) DEVICE — KIT GASTMY PERC PEG PULL 20FR -- ENDOVIVE BX/2

## (undated) DEVICE — Z DISCONTINUED NO SUB IDED CLAMP SURG 20FR REPL C PERC EN FEED DEV FOR PEG TB

## (undated) DEVICE — ADAPTER FEED 20FR UNIV Y PRT

## (undated) DEVICE — SYRINGE,PISTON,IRRIGATION,60ML,STERILE: Brand: MEDLINE

## (undated) DEVICE — CANNULA NSL O2 AD 7 FT END-TIDAL CARBON DIOX VENTFLO